# Patient Record
Sex: MALE | Race: WHITE | NOT HISPANIC OR LATINO | Employment: OTHER | URBAN - METROPOLITAN AREA
[De-identification: names, ages, dates, MRNs, and addresses within clinical notes are randomized per-mention and may not be internally consistent; named-entity substitution may affect disease eponyms.]

---

## 2018-07-31 ENCOUNTER — HOSPITAL ENCOUNTER (OUTPATIENT)
Facility: HOSPITAL | Age: 83
Discharge: HOME | End: 2018-08-01
Attending: HOSPITALIST | Admitting: HOSPITALIST
Payer: MEDICARE

## 2018-07-31 DIAGNOSIS — I63.9 STROKE: ICD-10-CM

## 2018-07-31 DIAGNOSIS — I63.311 THROMBOTIC STROKE INVOLVING RIGHT MIDDLE CEREBRAL ARTERY: ICD-10-CM

## 2018-07-31 LAB
BILIRUB UR QL STRIP: NEGATIVE
CLARITY UR: CLEAR
COLOR UR: YELLOW
CREAT SERPL-MCNC: 0.9 MG/DL
EST. GFR  (AFRICAN AMERICAN): >60 ML/MIN/1.73 M^2
EST. GFR  (NON AFRICAN AMERICAN): >60 ML/MIN/1.73 M^2
GLUCOSE UR QL STRIP: NEGATIVE
HGB UR QL STRIP: NEGATIVE
KETONES UR QL STRIP: NEGATIVE
LEUKOCYTE ESTERASE UR QL STRIP: NEGATIVE
NITRITE UR QL STRIP: NEGATIVE
PH UR STRIP: 7 [PH] (ref 5–8)
PROT UR QL STRIP: NEGATIVE
SP GR UR STRIP: 1.01 (ref 1–1.03)
URN SPEC COLLECT METH UR: NORMAL
UROBILINOGEN UR STRIP-ACNC: NEGATIVE EU/DL

## 2018-07-31 PROCEDURE — 94761 N-INVAS EAR/PLS OXIMETRY MLT: CPT

## 2018-07-31 PROCEDURE — 36415 COLL VENOUS BLD VENIPUNCTURE: CPT

## 2018-07-31 PROCEDURE — 81003 URINALYSIS AUTO W/O SCOPE: CPT

## 2018-07-31 PROCEDURE — 82565 ASSAY OF CREATININE: CPT

## 2018-07-31 PROCEDURE — G0378 HOSPITAL OBSERVATION PER HR: HCPCS

## 2018-07-31 PROCEDURE — 25000003 PHARM REV CODE 250: Performed by: HOSPITALIST

## 2018-07-31 PROCEDURE — A4216 STERILE WATER/SALINE, 10 ML: HCPCS | Performed by: HOSPITALIST

## 2018-07-31 PROCEDURE — G0379 DIRECT REFER HOSPITAL OBSERV: HCPCS

## 2018-07-31 PROCEDURE — 63600175 PHARM REV CODE 636 W HCPCS: Performed by: HOSPITALIST

## 2018-07-31 RX ORDER — OXYBUTYNIN CHLORIDE 5 MG/1
5 TABLET, EXTENDED RELEASE ORAL DAILY
Status: DISCONTINUED | OUTPATIENT
Start: 2018-08-01 | End: 2018-08-01 | Stop reason: HOSPADM

## 2018-07-31 RX ORDER — ENOXAPARIN SODIUM 100 MG/ML
40 INJECTION SUBCUTANEOUS EVERY 24 HOURS
Status: DISCONTINUED | OUTPATIENT
Start: 2018-07-31 | End: 2018-08-01 | Stop reason: HOSPADM

## 2018-07-31 RX ORDER — FESOTERODINE FUMARATE 4 MG/1
4 TABLET, EXTENDED RELEASE ORAL DAILY
COMMUNITY
End: 2023-01-25 | Stop reason: ALTCHOICE

## 2018-07-31 RX ORDER — ONDANSETRON 8 MG/1
8 TABLET, ORALLY DISINTEGRATING ORAL EVERY 8 HOURS PRN
Status: DISCONTINUED | OUTPATIENT
Start: 2018-07-31 | End: 2018-08-01 | Stop reason: HOSPADM

## 2018-07-31 RX ORDER — ASPIRIN 81 MG/1
81 TABLET ORAL DAILY
Status: DISCONTINUED | OUTPATIENT
Start: 2018-08-01 | End: 2018-08-01 | Stop reason: HOSPADM

## 2018-07-31 RX ORDER — LOVASTATIN 20 MG/1
20 TABLET ORAL NIGHTLY
COMMUNITY
End: 2022-10-19 | Stop reason: CLARIF

## 2018-07-31 RX ORDER — SODIUM CHLORIDE 0.9 % (FLUSH) 0.9 %
3 SYRINGE (ML) INJECTION EVERY 8 HOURS
Status: DISCONTINUED | OUTPATIENT
Start: 2018-07-31 | End: 2018-08-01 | Stop reason: HOSPADM

## 2018-07-31 RX ORDER — ATORVASTATIN CALCIUM 40 MG/1
40 TABLET, FILM COATED ORAL DAILY
Status: DISCONTINUED | OUTPATIENT
Start: 2018-08-01 | End: 2018-08-01 | Stop reason: HOSPADM

## 2018-07-31 RX ORDER — NAPROXEN SODIUM 220 MG/1
81 TABLET, FILM COATED ORAL DAILY
COMMUNITY

## 2018-07-31 RX ORDER — LABETALOL HYDROCHLORIDE 5 MG/ML
10 INJECTION, SOLUTION INTRAVENOUS
Status: DISCONTINUED | OUTPATIENT
Start: 2018-07-31 | End: 2018-08-01 | Stop reason: HOSPADM

## 2018-07-31 RX ADMIN — Medication 3 ML: at 09:07

## 2018-07-31 RX ADMIN — ENOXAPARIN SODIUM 40 MG: 100 INJECTION SUBCUTANEOUS at 07:07

## 2018-07-31 NOTE — PLAN OF CARE
Outside Transfer Acceptance Note     Patients name: FRANCIS COLLETTA, MRN: [id number]     Transferring Physician or Mid-Level provider/Clinic giving report:   DR. LEONARDO Brar AT [facility] ED     Accepting Physician for admission to hospital: Griffin Nascimento M.D.      Date of acceptance:  7/31/18, observation, level II, telemetry    Allergies: Review of patient's allergies indicates:  Allergies not on file     Reason for transfer:  Needs Neurology      Report from Physician/Mid-Level Provider:    PAST MEDICAL HISTORY:   -Cigarette smoker    HPI: Mr. Colletta is an 84 year old man who presents to the [facility] ED today after developing acute onset right facial drooping and dysarthria around 10:30am, with near resolution around 20 minutes later.  He does take an ASA 81mg po qday.      A tele-stroke was initiated with Dr. Holcomb (see recommendations below).  He was loaded with Plavix 300mg and given ASA 81mg po x 1 in the ED.    Physical Exam  Facial Palsy: 1-->Minor paralysis (flattened nasolabial fold, asymmetry on smiling)  Dysarthria: 1-->Mild-to-moderate dysarthria: patient slurs at least some words and, at worst, can be understood with some difficulty  Total (NIH Stroke Scale): 2     VS:   /114, HR 62, RR 16, o2 100% RA     Labs: BMP - NML, BUN 19, CR 1.1, INR 1, CRP 2, CBC NML    Radiographs:   CT HEAD NC: No hemorrhage. No mass effect. No early infarct signs. 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      EKG: NSR 61      To Do List upon arrival:     Recommendations from Neuro Tele-Stroke, Dr. Holcomb:  Thrombotic stroke involving right middle cerebral artery     Mild left facial droop and dysarthria, some pronation in his left UE but no drift, and no considerable subjective deficit per patient. Symptoms are < 1 hour old and have already improved substantially. Discussed risk and benefit of IVtpa with patient and the decsion at this point  Is to not use thrombolytics at this point. Patient, wife  and ED physician in agreement at this time.     Antithrombotics for secondary stroke prevention:  Load clopidogrel 300 mg x 1 now, followed by daily aspirin 81 mg /  clopidogrel 75 mg x 30 days followed by clopidogrel monotherapy therafter      Statins for secondary stroke prevention and hyperlipidemia, if present:   Statins: Atorvastatin- 80 mg daily     Aggressive risk factor modification: Smoking, HLD     Rehab efforts: PT/OT/SLP to evaluate and treat     Diagnostics ordered/pending: CTA Head to assess vasculature , CTA Neck/Arch to assess vasculature, HgbA1C to assess blood glucose levels, Lipid Profile to assess cholesterol levels, MRI head without contrast to assess brain parenchyma, TTE to assess cardiac function/status      VTE prophylaxis: Heparin 5000 units SQ every 8 hours     BP parameters: Infarct: No intervention, SBP <220      Alteplase Eligible?: No  Alteplase Recommendation: Alteplase not recommended due to Symptoms resolved and minimal deficit   Possible Interventional Revascularization Candidate? No; No significant neurological deficit     Disposition Recommendation: transfer to nearest appropriate facility

## 2018-08-01 VITALS
OXYGEN SATURATION: 98 % | HEIGHT: 69 IN | WEIGHT: 154.31 LBS | TEMPERATURE: 96 F | BODY MASS INDEX: 22.85 KG/M2 | HEART RATE: 56 BPM | DIASTOLIC BLOOD PRESSURE: 86 MMHG | SYSTOLIC BLOOD PRESSURE: 134 MMHG | RESPIRATION RATE: 18 BRPM

## 2018-08-01 PROBLEM — Z51.5 COMFORT MEASURES ONLY STATUS: Status: ACTIVE | Noted: 2018-08-01

## 2018-08-01 LAB
ALBUMIN SERPL BCP-MCNC: 3.6 G/DL
ALP SERPL-CCNC: 75 U/L
ALT SERPL W/O P-5'-P-CCNC: 12 U/L
ANION GAP SERPL CALC-SCNC: 3 MMOL/L
APTT BLDCRRT: 28 SEC
AST SERPL-CCNC: 19 U/L
BASOPHILS # BLD AUTO: 0.03 K/UL
BASOPHILS NFR BLD: 0.4 %
BILIRUB SERPL-MCNC: 0.7 MG/DL
BUN SERPL-MCNC: 18 MG/DL
CALCIUM SERPL-MCNC: 9.6 MG/DL
CHLORIDE SERPL-SCNC: 103 MMOL/L
CHOLEST SERPL-MCNC: 140 MG/DL
CHOLEST/HDLC SERPL: 3.4 {RATIO}
CO2 SERPL-SCNC: 34 MMOL/L
CREAT SERPL-MCNC: 1 MG/DL
DIASTOLIC DYSFUNCTION: YES
DIFFERENTIAL METHOD: NORMAL
EOSINOPHIL # BLD AUTO: 0.2 K/UL
EOSINOPHIL NFR BLD: 3.3 %
ERYTHROCYTE [DISTWIDTH] IN BLOOD BY AUTOMATED COUNT: 14 %
EST. GFR  (AFRICAN AMERICAN): >60 ML/MIN/1.73 M^2
EST. GFR  (NON AFRICAN AMERICAN): >60 ML/MIN/1.73 M^2
ESTIMATED AVG GLUCOSE: 114 MG/DL
ESTIMATED PA SYSTOLIC PRESSURE: 20.98
GLUCOSE SERPL-MCNC: 93 MG/DL
HBA1C MFR BLD HPLC: 5.6 %
HCT VFR BLD AUTO: 46.1 %
HDLC SERPL-MCNC: 41 MG/DL
HDLC SERPL: 29.3 %
HGB BLD-MCNC: 14.9 G/DL
INR PPP: 1
LDLC SERPL CALC-MCNC: 80.6 MG/DL
LYMPHOCYTES # BLD AUTO: 2.1 K/UL
LYMPHOCYTES NFR BLD: 28.1 %
MAGNESIUM SERPL-MCNC: 2.3 MG/DL
MCH RBC QN AUTO: 29 PG
MCHC RBC AUTO-ENTMCNC: 32.3 G/DL
MCV RBC AUTO: 90 FL
MITRAL VALVE MOBILITY: NORMAL
MONOCYTES # BLD AUTO: 0.8 K/UL
MONOCYTES NFR BLD: 10.5 %
NEUTROPHILS # BLD AUTO: 4.2 K/UL
NEUTROPHILS NFR BLD: 57.6 %
NONHDLC SERPL-MCNC: 99 MG/DL
PHOSPHATE SERPL-MCNC: 3.7 MG/DL
PLATELET # BLD AUTO: 168 K/UL
PMV BLD AUTO: 10.6 FL
POTASSIUM SERPL-SCNC: 5.1 MMOL/L
PROT SERPL-MCNC: 6.6 G/DL
PROTHROMBIN TIME: 10.4 SEC
RBC # BLD AUTO: 5.14 M/UL
RETIRED EF AND QEF - SEE NOTES: 60 (ref 55–65)
SODIUM SERPL-SCNC: 140 MMOL/L
TRICUSPID VALVE REGURGITATION: ABNORMAL
TRIGL SERPL-MCNC: 92 MG/DL
TSH SERPL DL<=0.005 MIU/L-ACNC: 1.06 UIU/ML
WBC # BLD AUTO: 7.34 K/UL

## 2018-08-01 PROCEDURE — 84100 ASSAY OF PHOSPHORUS: CPT

## 2018-08-01 PROCEDURE — G8978 MOBILITY CURRENT STATUS: HCPCS | Mod: CI

## 2018-08-01 PROCEDURE — 94761 N-INVAS EAR/PLS OXIMETRY MLT: CPT

## 2018-08-01 PROCEDURE — 92610 EVALUATE SWALLOWING FUNCTION: CPT

## 2018-08-01 PROCEDURE — 84443 ASSAY THYROID STIM HORMONE: CPT

## 2018-08-01 PROCEDURE — G0378 HOSPITAL OBSERVATION PER HR: HCPCS

## 2018-08-01 PROCEDURE — 93306 TTE W/DOPPLER COMPLETE: CPT

## 2018-08-01 PROCEDURE — 85025 COMPLETE CBC W/AUTO DIFF WBC: CPT

## 2018-08-01 PROCEDURE — G8998 SWALLOW D/C STATUS: HCPCS | Mod: CH

## 2018-08-01 PROCEDURE — 93306 TTE W/DOPPLER COMPLETE: CPT | Mod: 26,,, | Performed by: INTERNAL MEDICINE

## 2018-08-01 PROCEDURE — 25500020 PHARM REV CODE 255: Performed by: HOSPITALIST

## 2018-08-01 PROCEDURE — G8988 SELF CARE GOAL STATUS: HCPCS | Mod: CH

## 2018-08-01 PROCEDURE — 25000003 PHARM REV CODE 250: Performed by: HOSPITALIST

## 2018-08-01 PROCEDURE — 83735 ASSAY OF MAGNESIUM: CPT

## 2018-08-01 PROCEDURE — 97165 OT EVAL LOW COMPLEX 30 MIN: CPT

## 2018-08-01 PROCEDURE — 25000003 PHARM REV CODE 250: Performed by: PHYSICIAN ASSISTANT

## 2018-08-01 PROCEDURE — 83036 HEMOGLOBIN GLYCOSYLATED A1C: CPT

## 2018-08-01 PROCEDURE — A4216 STERILE WATER/SALINE, 10 ML: HCPCS | Performed by: HOSPITALIST

## 2018-08-01 PROCEDURE — G8987 SELF CARE CURRENT STATUS: HCPCS | Mod: CH

## 2018-08-01 PROCEDURE — G8979 MOBILITY GOAL STATUS: HCPCS | Mod: CI

## 2018-08-01 PROCEDURE — G0426 INPT/ED TELECONSULT50: HCPCS | Mod: GT,,, | Performed by: PSYCHIATRY & NEUROLOGY

## 2018-08-01 PROCEDURE — 85610 PROTHROMBIN TIME: CPT

## 2018-08-01 PROCEDURE — 97161 PT EVAL LOW COMPLEX 20 MIN: CPT

## 2018-08-01 PROCEDURE — G8997 SWALLOW GOAL STATUS: HCPCS | Mod: CH

## 2018-08-01 PROCEDURE — 36415 COLL VENOUS BLD VENIPUNCTURE: CPT

## 2018-08-01 PROCEDURE — 85730 THROMBOPLASTIN TIME PARTIAL: CPT

## 2018-08-01 PROCEDURE — G8996 SWALLOW CURRENT STATUS: HCPCS | Mod: CH

## 2018-08-01 PROCEDURE — 97802 MEDICAL NUTRITION INDIV IN: CPT

## 2018-08-01 PROCEDURE — G8980 MOBILITY D/C STATUS: HCPCS | Mod: CI

## 2018-08-01 PROCEDURE — G8989 SELF CARE D/C STATUS: HCPCS | Mod: CH

## 2018-08-01 PROCEDURE — 80053 COMPREHEN METABOLIC PANEL: CPT

## 2018-08-01 PROCEDURE — 80061 LIPID PANEL: CPT

## 2018-08-01 PROCEDURE — 99900039 HC SLP GENERIC THERAPY SCREENING (STAT)

## 2018-08-01 RX ORDER — AMLODIPINE BESYLATE 10 MG/1
10 TABLET ORAL DAILY
Qty: 30 TABLET | Refills: 11 | Status: SHIPPED | OUTPATIENT
Start: 2018-08-01 | End: 2023-03-22

## 2018-08-01 RX ADMIN — ASPIRIN 81 MG: 81 TABLET, COATED ORAL at 11:08

## 2018-08-01 RX ADMIN — Medication 3 ML: at 06:08

## 2018-08-01 RX ADMIN — ATORVASTATIN CALCIUM 40 MG: 40 TABLET, FILM COATED ORAL at 11:08

## 2018-08-01 RX ADMIN — OXYBUTYNIN CHLORIDE 5 MG: 5 TABLET, EXTENDED RELEASE ORAL at 11:08

## 2018-08-01 RX ADMIN — IOHEXOL 100 ML: 350 INJECTION, SOLUTION INTRAVENOUS at 08:08

## 2018-08-01 NOTE — PLAN OF CARE
Patient discharge instructions given and reviewed. Med rec reviewed with patient. Patient verbalized understanding. Education provided on new medication, diagnosis, and follow-up appointments. PIV d/isabel.  Tip intact. Pt tolerated well.  Tele removed.  Son at bedside to take pt home.

## 2018-08-01 NOTE — CONSULTS
"  [facility]  Adult Nutrition  Consult Note    SUMMARY     Recommendations    Recommendation/Intervention:   1. Encourage good intake at meals as tolerated.    Goals:   Pt will consume at least 50-75% intake at meals  Nutrition Goal Status: new  Communication of RD Recs: reviewed with RN    Reason for Assessment  Reason for Assessment: consult (assess dietary needs)  Diagnosis: stroke/CVA  Relevant Medical History: HLD, partial hip arthroplasty  General Information Comments: Pt on Regular diet. Tolerating with good intake at meals  Nutrition Discharge Planning: pt to d/c on Regular diet    Nutrition Risk Screen  Nutrition Risk Screen: no indicators present    Nutrition/Diet History  Food Preferences: no Baptist or cultural food prefs identified  Do you have any cultural, spiritual, Baptist conflicts, given your current situation?: none reported    Anthropometrics  Temp: 97.2 °F (36.2 °C)  Height: 5' 9" (175.3 cm)  Height (inches): 69 in  Weight Method: Bed Scale  Weight: 70 kg (154 lb 5.2 oz)  Weight (lb): 154.32 lb  Ideal Body Weight (IBW), Male: 160 lb  % Ideal Body Weight, Male (lb): 96.45 lb  BMI (Calculated): 22.8  BMI Grade: 18.5-24.9 - normal     Lab/Procedures/Meds  Pertinent Labs Reviewed: reviewed  Pertinent Medications Reviewed: reviewed  Pertinent Medications Comments: aspirin    Physical Findings/Assessment  Overall Physical Appearance:  (WNL)  Tubes:  (none)  Oral/Mouth Cavity: WDL  Skin:  (Johann 21-intact)    Estimated/Assessed Needs  Weight Used For Calorie Calculations: 70 kg (154 lb 5.2 oz)  Energy Calorie Requirements (kcal): 2100  Energy Need Method: Kcal/kg (30 kcal/kg)  Protein Requirements: 70g (1.0g/kg)  Weight Used For Protein Calculations: 70 kg (154 lb 5.2 oz)  Fluid Need Method: RDA Method  RDA Method (mL): 2100     Nutrition Prescription Ordered  Current Diet Order: Regular    Evaluation of Received Nutrient/Fluid Intake  Energy Calories Required: meeting " needs  Protein Required: meeting needs  Fluid Required: meeting needs  % Intake of Estimated Energy Needs: 50 - 75 %  % Meal Intake: 50 - 75 %    Nutrition Risk  Level of Risk/Frequency of Follow-up:  (1xweekly)     Assessment and Plan  No new Assessment & Plan notes have been filed under this hospital service since the last note was generated.  Service: Nutrition     Monitor and Evaluation  Food and Nutrient Intake: food and beverage intake  Food and Nutrient Adminstration: diet order  Physical Activity and Function: nutrition-related ADLs and IADLs  Anthropometric Measurements: weight  Biochemical Data, Medical Tests and Procedures: electrolyte and renal panel  Nutrition-Focused Physical Findings: overall appearance     Nutrition Follow-Up  RD Follow-up?: Yes

## 2018-08-01 NOTE — PLAN OF CARE
Follow-up With  Details  Why  Contact Info   late AM early PM preferred         Sinan Miller MD  Go on 8/7/2018  @ 3:30pm, FAX# [phone]  [address] [facility] [address]  [phone]   Nav Boyle MD  Go on 10/2/2018  @ 10:20am (this is Neurology follow up)  [address]  [facility] [address]  [phone]        [place] sent ambulatory referral and packet of notes to Neurologist Dr. Meza in [place]    Fax   Phone: [phone]  SEt for [facility] in case [facility] does not accept.     08/01/18 1631   Final Note   Assessment Type Final Discharge Note   Discharge Disposition Home   What phone number can be called within the next 1-3 days to see how you are doing after discharge? [phone]   Hospital Follow Up  Appt(s) scheduled? Yes   Discharge plans and expectations educations in teach back method with documentation complete? Yes   Right Care Referral Info   Post Acute Recommendation No Care

## 2018-08-01 NOTE — SUBJECTIVE & OBJECTIVE
Past Medical History:   Diagnosis Date    Frequent urination     Hyperlipidemia        Past Surgical History:   Procedure Laterality Date    JOINT REPLACEMENT         Review of patient's allergies indicates:  No Known Allergies    No current facility-administered medications on file prior to encounter.      No current outpatient prescriptions on file prior to encounter.     Family History     None        Social History Main Topics    Smoking status: Current Every Day Smoker     Packs/day: 0.25     Types: Cigarettes    Smokeless tobacco: Never Used    Alcohol use No    Drug use: No    Sexual activity: Not on file     Review of Systems   Constitutional: Negative for fever.   Respiratory: Negative for shortness of breath.    Cardiovascular: Negative for chest pain.   Gastrointestinal: Negative for abdominal pain, constipation, nausea and vomiting.   Musculoskeletal: Negative for gait problem.   Skin: Negative for color change.   Allergic/Immunologic: Negative for immunocompromised state.   Neurological: Positive for speech difficulty (slurred speech, resolved ). Negative for dizziness, weakness, numbness and headaches.   Psychiatric/Behavioral: Negative for agitation.   All other systems reviewed and are negative.    Objective:     Vital Signs (Most Recent):  Temp: 96.7 °F (35.9 °C) (07/31/18 1745)  Pulse: (!) 55 (07/31/18 1745)  Resp: 14 (07/31/18 1745)  BP: (!) 194/107 (07/31/18 1745) Vital Signs (24h Range):  Temp:  [96.7 °F (35.9 °C)-97.8 °F (36.6 °C)] 96.7 °F (35.9 °C)  Pulse:  [55-57] 55  Resp:  [14-16] 14  SpO2:  [98 %] 98 %  BP: (184-194)/(107-108) 194/107     Weight: 70 kg (154 lb 5.2 oz)  Body mass index is 22.79 kg/m².    Physical Exam   Constitutional: He is oriented to person, place, and time. He appears well-developed and well-nourished. No distress.   HENT:   Head: Normocephalic and atraumatic.   Mouth/Throat: Oropharynx is clear and moist.   Eyes: Conjunctivae and EOM are normal.   Neck: Normal  range of motion. Neck supple.   Cardiovascular: Normal rate, regular rhythm and normal heart sounds.    Pulmonary/Chest: Effort normal and breath sounds normal. No respiratory distress. He has no wheezes. He has no rales. He exhibits no tenderness.   Abdominal: Soft. Bowel sounds are normal. He exhibits no distension and no mass. There is no tenderness. There is no rebound and no guarding.   Musculoskeletal: Normal range of motion.   Neurological: He is alert and oriented to person, place, and time. No cranial nerve deficit. Coordination normal.   Skin: Skin is warm and dry. Capillary refill takes less than 2 seconds.   Psychiatric: He has a normal mood and affect.   Nursing note and vitals reviewed.        CRANIAL NERVES     CN III, IV, VI   Extraocular motions are normal.        Significant Labs: All pertinent labs within the past 24 hours have been reviewed.    Significant Imaging: I have reviewed and interpreted all pertinent imaging results/findings within the past 24 hours.

## 2018-08-01 NOTE — ASSESSMENT & PLAN NOTE
Stroke    Continue aspirin and lovastatin.  F/U with neuro in 6 weeks.  Amlodipine added for HTN.

## 2018-08-01 NOTE — PLAN OF CARE
Problem: Occupational Therapy Goal  Goal: Occupational Therapy Goal  Outcome: Outcome(s) achieved Date Met: 08/01/18  OT eval complete; note to follow. Pt performing at PLOF and no DME needs are noted this date. D/C IP OT

## 2018-08-01 NOTE — PLAN OF CARE
Problem: Physical Therapy Goal  Goal: Physical Therapy Goal  Outcome: Outcome(s) achieved Date Met: 08/01/18  PT evaluation completed, note to follow. Pt is functioning at his PLOF with no deficits noted. D/C PT with no needs upon d/c.

## 2018-08-01 NOTE — PLAN OF CARE
Problem: Patient Care Overview  Goal: Plan of Care Review  Outcome: Ongoing (interventions implemented as appropriate)  Recommendation/Intervention:   1. Encourage good intake at meals as tolerated.    Goals:   Pt will consume at least 50-75% intake at meals  Nutrition Goal Status: new  Communication of RD Recs: reviewed with RN

## 2018-08-01 NOTE — DISCHARGE SUMMARY
[facility] Hospital Medicine  Discharge Summary      Patient Name: Francis J Colletta  MRN: [id number]  Admission Date: 7/31/2018  Hospital Length of Stay: 0 days  Discharge Date and Time:  08/01/2018 5:19 PM  Attending Physician: Garret Shoemaker MD   Discharging Provider: Mery Davenport PA-C  Primary Care Provider: Sinan Miller MD      HPI:   CC: slurred speech at 11:45am, resolved     HPI: Francis J Colletta, a 84 y.o. male with PMH significant for HLD that presents for evaluation of an episode of slurred speech that started around 1145 am.  Patient states that it lasted for about 15 minutes and then resolved back to his baseline.  He was evaluated for this issue at a hospital in [place] were basic lab work including UA, CBC, CMP, PT, troponin and EKG were performed that showed no acute abnormalities.  He was transferred to this facility for a neurology consult.  Patient denies any previous history of stroke or MI.  Patient has no complaints at this time and states that he is feeling well.  Denies any numbness, weakness, CP or SOB.               * No surgery found *      Hospital Course:   MRI shows acute infarction along the body of the right caudate nucleus without associated hemorrhage or abnormal mass effects.  Neurovascular consult initiated and recommendations are to continue aspirin and Lovastatin then to f/u in six weeks.  Return with any new or worsening symptoms.       Consults:   Consults         Status Ordering Provider     Inpatient consult to Registered Dietitian/Nutritionist  Once     Provider:  (Not yet assigned)    Completed GARRET SHOEMAKER     Inpatient consult to Vascular (Stroke) Neurology  Once     Provider:  (Not yet assigned)    Acknowledged GARRET SHOEMAKER     IP consult to case management/social work  Once     Provider:  (Not yet assigned)    Acknowledged GARRET SHOEMAKER          * Thrombotic stroke involving right middle cerebral artery     Stroke    Continue aspirin and lovastatin.  F/U with neuro in 6 weeks.  F/U with PCP for evaluation of elevated BP.            Final Active Diagnoses:    Diagnosis Date Noted POA    PRINCIPAL PROBLEM:  Thrombotic stroke involving right middle cerebral artery [I63.311] 07/31/2018 Yes    Comfort measures only status [Z51.5] 08/01/2018 Not Applicable    Stroke [I63.9] 07/31/2018 Yes      Problems Resolved During this Admission:    Diagnosis Date Noted Date Resolved POA       Discharged Condition: good    Disposition: Home or Self Care    Follow Up:  Follow-up Information     late AM early PM preferred.           Sinan Miller MD. Go on 8/7/2018.    Specialty:  Internal Medicine  Why:  @ 3:30pm, FAX#  [phone]  Contact information:  [address] [place] [address]  [phone]             Nav Boyle MD. Go on 10/2/2018.    Specialty:  Neurology  Why:  @ 10:20am (this is Neurology follow up)  Contact information:  [address] [address]  [phone]                 Patient Instructions:     Ambulatory Referral to Vascular Neurology   Referral Priority: Routine Referral Type: Consultation   Referral Reason: Specialty Services Required    Requested Specialty: Vascular Neurology    Number of Visits Requested: 1      Diet Adult Regular     Activity as tolerated         Significant Diagnostic Studies: Labs:   CMP   Recent Labs  Lab 07/31/18  1918 08/01/18  0457   NA  --  140   K  --  5.1   CL  --  103   CO2  --  34*   GLU  --  93   BUN  --  18   CREATININE 0.9 1.0   CALCIUM  --  9.6   PROT  --  6.6   ALBUMIN  --  3.6   BILITOT  --  0.7   ALKPHOS  --  75   AST  --  19   ALT  --  12   ANIONGAP  --  3*   ESTGFRAFRICA >60 >60   EGFRNONAA >60 >60   , CBC   Recent Labs  Lab 08/01/18  0457   WBC 7.34   HGB 14.9   HCT 46.1      , Lipid Panel   Lab Results   Component Value Date    CHOL 140 08/01/2018    HDL 41 08/01/2018    LDLCALC 80.6 08/01/2018    TRIG 92 08/01/2018    CHOLHDL 29.3  08/01/2018    and Troponin No results for input(s): TROPONINI in the last 168 hours.  Radiology: MRI: Acute infarction along the body of the right caudate nucleus without associated hemorrhage or abnormal mass effects.     Pending Diagnostic Studies:     None         Medications:  Reconciled Home Medications:      Medication List      CONTINUE taking these medications    aspirin 81 MG Chew  Take 81 mg by mouth once daily.     lovastatin 20 MG tablet  Commonly known as:  MEVACOR  Take 20 mg by mouth every evening.     TOVIAZ 4 mg Tb24  Generic drug:  fesoterodine  Take 4 mg by mouth once daily.            Indwelling Lines/Drains at time of discharge:   Lines/Drains/Airways          No matching active lines, drains, or airways          Time spent on the discharge of patient: 35 minutes  Patient was seen and examined on the date of discharge and determined to be suitable for discharge.         Mery Davenport PA-C  Department of Hospital Medicine  [facility]

## 2018-08-01 NOTE — PLAN OF CARE
Problem: Patient Care Overview  Goal: Plan of Care Review  Outcome: Revised  Pt stable. NO distress noted. POC reviewed with pt. Pt verbalized understanding. Pt remains SB on the monitor. NO true red alarms noted. Pt denied pain. Neuro intact with NIH score of 1. NO drifts noted. Pt afebrile. Fall precautions maintained. Bed in lowest position, call light in reach and bed alarm on.

## 2018-08-01 NOTE — PT/OT/SLP EVAL
Physical Therapy Evaluation and Discharge Note    Patient Name:  Francis J Colletta   MRN:  [id number]    Recommendations:     Discharge Recommendations:  home   Discharge Equipment Recommendations: none   Barriers to discharge: None    Assessment:     Francis J Colletta is a 84 y.o. male admitted with a medical diagnosis of Thrombotic stroke involving right middle cerebral artery. At this time, patient is functioning at their prior level of function and does not require further acute PT services. Pt is functioning at his PLOF with no deficits noted. D/C PT with no needs upon d/c.    Recent Surgery: * No surgery found *      Plan:     During this hospitalization, patient does not require further acute PT services.  Please re-consult if situation changes.     Plan of Care Reviewed with: patient, spouse    Subjective     Communicated with DON Harmon prior to session.  Patient found supine with HOB elevated upon PT entry to room, agreeable to evaluation.      Chief Complaint: none  Patient comments/goals: pt reporting he feels back to his baselin  Pain/Comfort:  · Pain Rating 1: 0/10 (reports slight pain in R hip with gait that is his baseline)    Patients cultural, spiritual, Yazidi conflicts given the current situation: none reported    Living Environment:  Pt lives with his wife in a [facility] with THE and tub/shower combo.  Prior to admission, patients level of function was independent without AD for all mobility and ADLs without AD and drives.  Patient has the following equipment: none.  DME owned (not currently used): rolling walker and single point cane.  Upon discharge, patient will have assistance from his wife is home to provide supervision.    Objective:     Patient found with: telemetry, bed alarm     General Precautions: Standard, fall   Orthopedic Precautions:N/A   Braces: N/A     Exams:  · Cognitive Exam:  Patient is oriented x 4 and follows 100% of multi-step commands   · Postural Exam:  Patient  presented with the following abnormalities:    · -       Rounded shoulders  · -       Forward head  · Sensation:    · -       Intact  · Skin Integrity/Edema:      · -       Skin integrity: Visible skin intact  · -       Edema: None noted    · RLE ROM: WFL except lacking ~20 deg from terminal knee extension  · RLE Strength: WFL  · LLE ROM: WFL except lacking ~20 deg from terminal knee ext  · LLE Strength: WFL    Functional Mobility:  · Bed Mobility:     · Scooting: modified independence  · Supine to Sit: modified independence  · Sit to Supine: modified independence  · Transfers:     · Sit to Stand:  modified independence with no AD  · Gait: 200 ft with no AD mod I over level surface    AM-PAC 6 CLICK MOBILITY  Total Score:23       Therapeutic Activities and Exercises:  Pt with no noted deficits and functioning at mod I level.     Patient left HOB elevated with all lines intact, call button in reach, bed alarm on, RN notified and pt's wife present.    GOALS:    Physical Therapy Goals     Not on file          Multidisciplinary Problems (Resolved)        Problem: Physical Therapy Goal    Goal Priority Disciplines Outcome Goal Variances Interventions   Physical Therapy Goal   (Resolved)     PT/OT, PT Outcome(s) achieved                     History:     Past Medical History:   Diagnosis Date    Frequent urination     Hyperlipidemia     Hyperlipidemia        Past Surgical History:   Procedure Laterality Date    EYE SURGERY      JOINT REPLACEMENT      PARTIAL HIP ARTHROPLASTY Left        Clinical Decision Making:     History  Co-morbidities and personal factors that may impact the plan of care Examination  Body Structures and Functions, activity limitations and participation restrictions that may impact the plan of care Clinical Presentation   Decision Making/ Complexity Score   Co-morbidities:   [] Time since onset of injury / illness / exacerbation  [] Status of current condition  []Patient's cognitive status and  safety concerns    [] Multiple Medical Problems (see med hx)  Personal Factors:   [x] Patient's age  [] Prior Level of function   [] Patient's home situation (environment and family support)  [] Patient's level of motivation  [] Expected progression of patient      HISTORY:(criteria)    [] 67739 - no personal factors/history    [x] 81249 - has 1-2 personal factor/comorbidity     [] 13241 - has >3 personal factor/comorbidity     Body Regions:  [] Objective examination findings  [] Head     []  Neck  [] Trunk   [] Upper Extremity  [] Lower Extremity    Body Systems:  [] For communication ability, affect, cognition, language, and learning style: the assessment of the ability to make needs known, consciousness, orientation (person, place, and time), expected emotional /behavioral responses, and learning preferences (eg, learning barriers, education  needs)  [] For the neuromuscular system: a general assessment of gross coordinated movement (eg, balance, gait, locomotion, transfers, and transitions) and motor function  (motor control and motor learning)  [] For the musculoskeletal system: the assessment of gross symmetry, gross range of motion, gross strength, height, and weight  [] For the integumentary system: the assessment of pliability(texture), presence of scar formation, skin color, and skin integrity  [] For cardiovascular/pulmonary system: the assessment of heart rate, respiratory rate, blood pressure, and edema     Activity limitations:    [] Patient's cognitive status and saf ety concerns          [] Status of current condition      [] Weight bearing restriction  [] Cardiopulmunary Restriction    Participation Restrictions:   [] Goals and goal agreement with the patient     [] Rehab potential (prognosis) and probable outcome      Examination of Body System: (criteria)    [x] 53231 - addressing 1-2 elements    [] 80412 - addressing a total of 3 or more elements     [] 66870 -  Addressing a total of 4 or more  elements         Clinical Presentation: (criteria)  Stable - 28271     On examination of body system using standardized tests and measures patient presents with 1-2 elements from any of the following: body structures and functions, activity limitations, and/or participation restrictions.  Leading to a clinical presentation that is considered stable and/or uncomplicated                              Clinical Decision Making  (Eval Complexity):  Low- 81590     Time Tracking:     PT Received On: 08/01/18  PT Start Time: 1140     PT Stop Time: 1159  PT Total Time (min): 19 min     Billable Minutes: Evaluation 19      Janna Cheng, PT  08/01/2018

## 2018-08-01 NOTE — ASSESSMENT & PLAN NOTE
Stroke    Stroke w/u initiated.  MRI, CT/CTA ordered.  PT/OT/SLP.  2D ECHO, lipid panel, A1C,TSH, Neuro Checks Q 4.

## 2018-08-01 NOTE — PT/OT/SLP EVAL
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 male with PMH significant for HLD that presents for evaluation of an episode of slurred speech that started around 1145 am.  Patient states that it lasted for about 15 minutes and then resolved back to his baseline.  He was evaluated for this issue at a hospital in [place] were basic lab work including UA, CBC, CMP, PT, troponin and EKG were performed that showed no acute abnormalities.  He was transferred to this facility for a neurology consult.  Patient denies any previous history of stroke or MI.  Patient has no complaints at this time and states that he is feeling well.  Denies any numbness, weakness, CP or SOB.      Social History: Patient lives with spouse at home in [place], [place].    Prior Intubation HX:  N/A    Modified Barium Swallow: None on file    Chest X-Rays: None on file    Prior diet: Per pt, regular/thin liquids PO diet.    Subjective     Pt participate in clinical swallow eval and informal speech screening this PM. SLP confirmed with RN prior to entry. Pt oriented x4 and agreed to participate in skilled ST session.    Patient goals: "I was supposed to be getting dental work done yesterday but this all happened" per pt     Pain/Comfort:  · Pain Rating " 1: 010    Objective:     Oral Musculature Evaluation  · Oral Musculature: WFL  · Dentition: present and adequate  · Mucosal Quality: good  · Mandibular Strength and Mobility: WFL  · Oral Labial Strength and Mobility: WFL  · Lingual Strength and Mobility: WFL  · Buccal Strength and Mobility: WFL  · Volitional Swallow:  (timely upon palpation)  · Voice Prior to PO Intake:  (clear)    Bedside Swallow Eval:   Consistencies Assessed:  · Thin liquids -via cup sips x4, straw x5  · Puree -(apple sauce) x4  · Solids -(giovany cracker) x3     Oral Phase:   · WFL    Pharyngeal Phase:   · no overt clinical signs/symptoms of aspiration  · no overt clinical signs/symptoms of pharyngeal dysphagia    Compensatory Strategies  · None    Speech-Language Screening:  -Pt oriented to name, , situation and time  -Pt demonstrated good STM skills, as pt able to recall 3/3 unrelated words, with min cuing (pt required catagory x1) given by SLP with 2 min delay during delayed memory recall task  -Pt demonstrated receptive and expressive language skills to be judged WFL-- pt able to follow 2-3 step commands, identify items within room, and demonstrated appropriate sentence formulation during conversations.  -Pt also able to name 10/10 items within in room and given body parts  -Pt able to recall PmHx and state reasoning for current admission here at [facility]  -Pt also participated in diadochokinetics task (/pa/, /ta/, /ka/) and demonstrated motor speech skills to be judged WFL   -Overall, pt demonstrates baseline speech-language and cognitive skills.    Treatment: Pt participated in clinical swallow eval this PM. Pt tolerated thin liquids, puree, and hard solid textures with no overt s/s of aspiration, at bedside. Pt also demonstrates baseline cognitive-linguistic skills.  SLP educated pt and pt's spouse on role of SLP, clinical swallow eval, diet recs/swallow precautions, informal speech screen/results and POC. Pt and pt's spouse  acknowledged and confirmed understanding.        Assessment:     Francis J Colletta is a 84 y.o. male admitted with Thrombotic stroke involving right middle cerebral artery.  He presents with oral and pharyngeal phases of the swallow judged to be WFL. Pt also demonstrates baseline cognitive-linguistic skills.   SLP recs: Regular/thin liquids po diet with universal swallow precautions. ST services no longer required as pt has met all ST goals. SLP notified Komal ESQUEDA of results/recs. Please re-consult should pt experience any change in status.    Goals:    SLP Goals     Not on file          Multidisciplinary Problems (Resolved)        Problem: SLP Goal    Goal Priority Disciplines Outcome   SLP Goal   (Resolved)     SLP Outcome(s) achieved   Description:  Short Term Goals:  1. Pt will participate in BSS to determine least restrictive diet.- MET 8/1  2. Pt will participate in informal speech-lang eval to r/o cognitive-linguistic deficits.- MET 8/1                      Plan:     · Plan of Care reviewed with:  patient, spouse (Komal ESQUEDA)   · SLP Follow-Up:  No       Discharge recommendations:  home   Barriers to Discharge:  None    Time Tracking:     SLP Treatment Date:   08/01/18  Speech Start Time:  1350  Speech Stop Time:  1410     Speech Total Time (min):  20 min    Billable Minutes: Eval Swallow and Oral Function 20    JAYCE Ward., CF-SLP  Speech-Language Pathologist   8/1/2018

## 2018-08-01 NOTE — H&P
[facility] Hospital Medicine  History & Physical    Patient Name: Francis J Colletta  MRN: [id number]  Admission Date: 7/31/2018  Attending Physician: Garret Rosales MD   Primary Care Provider: Primary Doctor No         Patient information was obtained from patient, spouse/SO, relative(s) and ER records.     Subjective:     Principal Problem:Thrombotic stroke involving right middle cerebral artery    Chief Complaint: No chief complaint on file.       HPI: CC: slurred speech at 11:45am, resolved     HPI: Francis J Colletta, a 84 y.o. male with PMH significant for HLD that presents for evaluation of an episode of slurred speech that started around 1145 am.  Patient states that it lasted for about 15 minutes and then resolved back to his baseline.  He was evaluated for this issue at a hospital in [place] were basic lab work including UA, CBC, CMP, PT, troponin and EKG were performed that showed no acute abnormalities.  He was transferred to this facility for a neurology consult.  Patient denies any previous history of stroke or MI.  Patient has no complaints at this time and states that he is feeling well.  Denies any numbness, weakness, CP or SOB.               Past Medical History:   Diagnosis Date    Frequent urination     Hyperlipidemia        Past Surgical History:   Procedure Laterality Date    JOINT REPLACEMENT         Review of patient's allergies indicates:  No Known Allergies    No current facility-administered medications on file prior to encounter.      No current outpatient prescriptions on file prior to encounter.     Family History     None        Social History Main Topics    Smoking status: Current Every Day Smoker     Packs/day: 0.25     Types: Cigarettes    Smokeless tobacco: Never Used    Alcohol use No    Drug use: No    Sexual activity: Not on file     Review of Systems   Constitutional: Negative for fever.   Respiratory: Negative for shortness of breath.     Cardiovascular: Negative for chest pain.   Gastrointestinal: Negative for abdominal pain, constipation, nausea and vomiting.   Musculoskeletal: Negative for gait problem.   Skin: Negative for color change.   Allergic/Immunologic: Negative for immunocompromised state.   Neurological: Positive for speech difficulty (slurred speech, resolved ). Negative for dizziness, weakness, numbness and headaches.   Psychiatric/Behavioral: Negative for agitation.   All other systems reviewed and are negative.    Objective:     Vital Signs (Most Recent):  Temp: 96.7 °F (35.9 °C) (07/31/18 1745)  Pulse: (!) 55 (07/31/18 1745)  Resp: 14 (07/31/18 1745)  BP: (!) 194/107 (07/31/18 1745) Vital Signs (24h Range):  Temp:  [96.7 °F (35.9 °C)-97.8 °F (36.6 °C)] 96.7 °F (35.9 °C)  Pulse:  [55-57] 55  Resp:  [14-16] 14  SpO2:  [98 %] 98 %  BP: (184-194)/(107-108) 194/107     Weight: 70 kg (154 lb 5.2 oz)  Body mass index is 22.79 kg/m².    Physical Exam   Constitutional: He is oriented to person, place, and time. He appears well-developed and well-nourished. No distress.   HENT:   Head: Normocephalic and atraumatic.   Mouth/Throat: Oropharynx is clear and moist.   Eyes: Conjunctivae and EOM are normal.   Neck: Normal range of motion. Neck supple.   Cardiovascular: Normal rate, regular rhythm and normal heart sounds.    Pulmonary/Chest: Effort normal and breath sounds normal. No respiratory distress. He has no wheezes. He has no rales. He exhibits no tenderness.   Abdominal: Soft. Bowel sounds are normal. He exhibits no distension and no mass. There is no tenderness. There is no rebound and no guarding.   Musculoskeletal: Normal range of motion.   Neurological: He is alert and oriented to person, place, and time. No cranial nerve deficit. Coordination normal.   Skin: Skin is warm and dry. Capillary refill takes less than 2 seconds.   Psychiatric: He has a normal mood and affect.   Nursing note and vitals reviewed.        CRANIAL NERVES     CN  III, IV, VI   Extraocular motions are normal.        Significant Labs: All pertinent labs within the past 24 hours have been reviewed.    Significant Imaging: I have reviewed and interpreted all pertinent imaging results/findings within the past 24 hours.    Assessment/Plan:     * Thrombotic stroke involving right middle cerebral artery      Stroke    Stroke w/u initiated.  MRI, CT/CTA ordered.  PT/OT/SLP.  2D ECHO, lipid panel, A1C,TSH, Neuro Checks Q 4.            VTE Risk Mitigation         Ordered     enoxaparin injection 40 mg  Daily      07/31/18 1900     IP VTE LOW RISK PATIENT  Once      07/31/18 1900             Mery Davenport PA-C  Department of Hospital Medicine   [facility]

## 2018-08-01 NOTE — HPI
CC: slurred speech at 11:45am, resolved     HPI: Francis J Colletta, a 84 y.o. male with PMH significant for HLD that presents for evaluation of an episode of slurred speech that started around 1145 am.  Patient states that it lasted for about 15 minutes and then resolved back to his baseline.  He was evaluated for this issue at a hospital in [place] were basic lab work including UA, CBC, CMP, PT, troponin and EKG were performed that showed no acute abnormalities.  He was transferred to this facility for a neurology consult.  Patient denies any previous history of stroke or MI.  Patient has no complaints at this time and states that he is feeling well.  Denies any numbness, weakness, CP or SOB.

## 2018-08-01 NOTE — PLAN OF CARE
Problem: SLP Goal  Goal: SLP Goal  Short Term Goals:  1. Pt will participate in BSS to determine least restrictive diet.- MET 8/1  2. Pt will participate in informal speech-lang eval to r/o cognitive-linguistic deficits.- MET 8/1    Outcome: Outcome(s) achieved Date Met: 08/01/18 8/1:  Pt participated in clinical swallow eval this PM. Pt tolerated thin liquids, puree, and hard solid textures with no overt s/s of aspiration, at bedside. Pt also demonstrates baseline cognitive-linguistic skills. SLP recs: Regular/thin liquids po diet with universal swallow precautions. ST services no longer required as pt has met all ST goals. SLP notified Komal ESQUEDA of results/recs. Please re-consult should pt experience any change in status.  JAYCE Ward., CF-SLP  Speech-Language Pathologist

## 2018-08-01 NOTE — PT/OT/SLP PROGRESS
Physical Therapy  Eval Attempts    Patient Name:  Francis J Colletta   MRN:  [id number]    Patient not seen today secondary to pt LÓPEZ for testing during 0915 and 1105 attempts. Will follow-up as available.    Janna Cheng, PT   8/1/2018

## 2018-08-01 NOTE — HOSPITAL COURSE
MRI shows acute infarction along the body of the right caudate nucleus without associated hemorrhage or abnormal mass effects.  Neurovascular consult initiated and recommendations are to continue aspirin and Lovastatin then to f/u in six weeks.  Return with any new or worsening symptoms.

## 2018-08-01 NOTE — PLAN OF CARE
Patient is out for testing, wife completed initial DC assessment questions.    Patient is independent and drives, wife of same capacity.    Son lives in [place], will provide transportation home once ready.    No hx of neuro or cards.    PCP office closed at this time, will call for f/u.    Patient likely to need ambulatory referral to Neurology as they live [place] away.  Follow-up With  Details  Why  Contact Info   late AM early PM preferred         Sinan Miller MD    office opens at 9am  [address] [place] [id number]  [phone]             08/01/18 0808   Discharge Assessment   Assessment Type Discharge Planning Assessment   Prior to hospitilization cognitive status: Alert/Oriented   Prior to hospitalization functional status: Independent   Facility Arrived From: home   Lives With spouse   Able to Return to Prior Arrangements unable to determine at this time (comments)   Who are your caregiver(s) and their phone number(s)? LeniJimmy armenta Spouse     [phone]    Patient's perception of discharge disposition home or selfcare   Readmission Within The Last 30 Days no previous admission in last 30 days   Patient currently being followed by outpatient case management? No   Patient currently receives any other outside agency services? No   Equipment Currently Used at Home none   Do you have any problems affording any of your prescribed medications? No   Is the patient taking medications as prescribed? yes   Does the patient have transportation home? Yes   Transportation Available family or friend will provide   Dialysis Name and Scheduled days n/a   Does the patient receive services at the [facility]? No   Discharge Plan A Home with family;Home Health   Patient/Family In Agreement With Plan yes

## 2018-08-01 NOTE — PT/OT/SLP EVAL
Occupational Therapy   Evaluation and Discharge Note    Name: Francis J Colletta  MRN: [id number]  Admitting Diagnosis:  Thrombotic stroke involving right middle cerebral artery      Recommendations:     Discharge Recommendations: home  Discharge Equipment Recommendations:  none  Barriers to discharge:  None    History:     Occupational Profile:  Living Environment: Pt lives with spouse in [facility] [place], tub/shower combo.  Previous level of function: I with all ADLs, functional mobility, and driving.   Equipment Owned:  none  Assistance upon Discharge: Spouse/Family    Past Medical History:   Diagnosis Date    Frequent urination     Hyperlipidemia     Hyperlipidemia        Past Surgical History:   Procedure Laterality Date    EYE SURGERY      JOINT REPLACEMENT      PARTIAL HIP ARTHROPLASTY Left        Subjective     Chief Complaint: Pt with no complaints this date.  Patient/Family stated goals: To return home  Communicated with: yusuf prior to session.  Pain/Comfort:  · Pain Rating 1: 0/10    Patients cultural, spiritual, Tenriism conflicts given the current situation:      Objective:     Patient found with: telemetry, bed alarm    General Precautions: Standard, fall   Orthopedic Precautions:N/A   Braces: N/A     Occupational Performance:    Bed Mobility:    · Patient completed Rolling/Turning to Left with  independence  · Patient completed Scooting/Bridging with independence  · Patient completed Supine to Sit with independence  · Patient completed Sit to Supine with independence    Functional Mobility/Transfers:  · Patient completed Sit <> Stand Transfer with independence  with  no assistive device   · Functional Mobility: Pt noted that since R hip replacement 1 year ago, he occasionally has difficulty with decreased dorsiflexion RLE    Activities of Daily Living:  · Upper Body Dressing: independence seated EOB to don gown as robe.  · Lower Body Dressing: independence seated EOB to don/doff  socks/shoes    Cognitive/Visual Perceptual:  Cognitive/Psychosocial Skills:     -       Oriented to: Person, Place, Time and Situation   -       Follows Commands/attention:Follows multistep  commands  -       Communication: clear/fluent  -       Memory: No Deficits noted  -       Safety awareness/insight to disability: intact   -       Mood/Affect/Coping skills/emotional control: Appropriate to situation  Visual/Perceptual:      -Intact    Physical Exam:  Balance: -       WFL; dynamic and static seated balance Good; dynamic standing balance Good  Postural examination/scapula alignment:    -       Rounded shoulders  -       Forward head  Sensation:    -       Intact  Motor Planning: -       WFL; no deficits noted  Upper Extremity Range of Motion:  BUE WFL  Upper Extremity Strength: BUE WFL   Strength: 4+/5  Fine Motor Coordination: Pt with slight difficulty to perform thumb/finger opposition, requiring inc v/c to perform skill.   Gross motor coordination: BUE WF  Neurological: No neurological deficits noted this date. Pt reports no decreased sensation/strength.    Patient left HOB elevated with all lines intact, call button in reach, bed alarm on, nsg notified and spouse present    [facility] 6 Click:  [facility] Total Score: 24    Treatment & Education:  Pt educated on role of OT and POC.  Pt educated on fall prevention measures including possible side effects of new medication, use of non-skid socks while ambulating, and the importance of calling for nsg staff prior to getting out of bed. Pt verbalized understanding.  Pt performing skills as listed above at [facility] and states that he feels he is at baseline, wife agreed.  Education:    Assessment:     Francis J Colletta is a 84 y.o. male with a medical diagnosis of Thrombotic stroke involving right middle cerebral artery. At this time, patient is functioning at their prior level of function and does not require further acute OT services.     Clinical Decision Making:  "    1.  OT Low:  "Pt evaluation falls under low complexity for evaluation coding due to performance deficits noted in 1-3 areas as stated above and 0 co-morbities affecting current functional status. Data obtained from problem focused assessments. No modifications or assistance was required for completion of evaluation. Only brief occupational profile and history review completed."     Plan:     During this hospitalization, patient does not require further acute OT services.  Please re-consult if situation changes.    · Plan of Care Reviewed with: patient, spouse    This Plan of care has been discussed with the patient who was involved in its development and understands and is in agreement with the identified goals and treatment plan    GOALS:    Occupational Therapy Goals     Not on file          Multidisciplinary Problems (Resolved)        Problem: Occupational Therapy Goal    Goal Priority Disciplines Outcome Interventions   Occupational Therapy Goal   (Resolved)     OT, PT/OT Outcome(s) achieved                    Time Tracking:     OT Date of Treatment: 08/01/18  OT Start Time: 1142  OT Stop Time: 1155  OT Total Time (min): 13 min CoTx PT    Billable Minutes:Evaluation 13    Christine Walsh OT  8/1/2018    "

## 2018-08-02 NOTE — HPI
75 y/o male transferred from [facility] to [facility] for evaluation of a 15 minute episode of dysarthria on 7/31/2018.

## 2018-08-02 NOTE — PROGRESS NOTES
[facility]  Vascular Neurology  Comprehensive Stroke Center  Progress Note    Assessment/Plan:     * Thrombotic stroke involving right middle cerebral artery    Thrombotic stroke involving right middle cerebral artery  Antithrombotics for secondary stroke prevention: Antiplatelets: Aspirin: 81 mg daily    Statins for secondary stroke prevention and hyperlipidemia, if present:   Statins: Atorvastatin- 40 mg daily    Aggressive risk factor modification: HTN, HLD     Rehab efforts: None: Reason: No deficits    Diagnostics ordered/pending: None     VTE prophylaxis: None: Reason for No Pharmacological VTE Prophylaxis: Ambulating with or without assistance    BP parameters: Infarct: gradually bring down to < 130/80                 No notes on file    STROKE DOCUMENTATION        NIH Scale:  Interval: 7 days or at discharge (whichever comes first)  1a. Level Of Consciousness: 0-->Alert: keenly responsive  1b. LOC Questions: 0-->Answers both questions correctly  1c. LOC Commands: 0-->Performs both tasks correctly  2. Best Gaze: 0-->Normal  3. Visual: 0-->No visual loss  4. Facial Palsy: 0-->Normal symmetrical movements  5a. Motor Arm, Left: 0-->No drift: limb holds 90 (or 45) degrees for full 10 secs  5b. Motor Arm, Right: 0-->No drift: limb holds 90 (or 45) degrees for full 10 secs  6a. Motor Leg, Left: 0-->No drift: leg holds 30 degree position for full 5 secs  6b. Motor Leg, Right: 0-->No drift: leg holds 30 degree position for full 5 secs  7. Limb Ataxia: 0-->Absent  8. Sensory: 0-->Normal: no sensory loss  9. Best Language: 0-->No aphasia: normal  10. Dysarthria: 0-->Normal  11. Extinction and Inattention (formerly Neglect): 0-->No abnormality  Total (NIH Stroke Scale): 0       Modified [place] Score: 0  [place] Coma Scale:    ABCD2 Score:    [id number] Score:   HAS -BLED Score:   ICH Score:   Hunt & Lucio Classification:      Hemorrhagic change of an Ischemic Stroke: Does this patient have an  ischemic stroke with hemorrhagic changes? No     Neurologic Chief Complaint: Stroke    Subjective:     Interval History: Patient is seen for follow-up neurological assessment and treatment recommendations:     HPI, Past Medical, Family, and Social History remains the same as documented in the initial encounter.     Review of Systems   Neurological: Positive for speech difficulty.   All other systems reviewed and are negative.    Scheduled Meds:   aspirin  81 mg Oral Daily    atorvastatin  40 mg Oral Daily    enoxaparin  40 mg Subcutaneous Daily    oxybutynin  5 mg Oral Daily    sodium chloride 0.9%  3 mL Intravenous Q8H     Continuous Infusions:  PRN Meds:labetalol, ondansetron    Objective:     Vital Signs (Most Recent):  Temp: 96.3 °F (35.7 °C) (08/01/18 1726)  Pulse: (!) 56 (08/01/18 1726)  Resp: 18 (08/01/18 1726)  BP: 134/86 (08/01/18 1726)  SpO2: 98 % (08/01/18 1550)  BP Location: Left arm    Vital Signs Range (Last 24H):  Temp:  [96.3 °F (35.7 °C)-98.3 °F (36.8 °C)]   Pulse:  [54-67]   Resp:  [14-18]   BP: (134-186)/()   SpO2:  [94 %-98 %]   BP Location: Left arm    Physical Exam   Constitutional: He is oriented to person, place, and time. He appears well-developed and well-nourished.   HENT:   Head: Normocephalic and atraumatic.   Eyes: EOM are normal. Pupils are equal, round, and reactive to light.   Cardiovascular: Normal rate and regular rhythm.    Pulmonary/Chest: Effort normal.   Neurological: He is alert and oriented to person, place, and time.   Vitals reviewed.      Neurological Exam:   LOC: alert  Language: No aphasia  Articulation: No dysarthria  Orientation: Person, Place, Time   Visual Fields: Full  EOM (CN III, IV, VI): Full/intact  Pupils (CN II, III): PERRL  Facial Sensation (CN V): Normal  Facial Movement (CN VII): Symmetric facial expression    Motor: Arm left  Normal 5/5  Leg left  Normal 5/5  Arm right  Normal 5/5  Leg right Normal 5/5  Cebellar: No evidence of appendicular or  axial ataxia  Sensation: Intact to light touch, temperature and vibration    Laboratory:  CMP:   Recent Labs  Lab 08/01/18  0457   CALCIUM 9.6   ALBUMIN 3.6   PROT 6.6      K 5.1   CO2 34*      BUN 18   CREATININE 1.0   ALKPHOS 75   ALT 12   AST 19   BILITOT 0.7     CBC:   Recent Labs  Lab 08/01/18 0457   WBC 7.34   RBC 5.14   HGB 14.9   HCT 46.1      MCV 90   MCH 29.0   MCHC 32.3     Lipid Panel:   Recent Labs  Lab 08/01/18 0457   CHOL 140   LDLCALC 80.6   HDL 41   TRIG 92     Hgb A1C:   Recent Labs  Lab 08/01/18 0457   HGBA1C 5.6       Diagnostic Results     Brain Imaging   MRI:  Acute infarction along the body of the right caudate nucleus without associated hemorrhage or abnormal mass effects.  2. Extensive periventricular white matter disease most likely representing chronic microvascular ischemia  Vessel Imaging   CTA  Acute infarction along the body of the right caudate nucleus without associated hemorrhage or abnormal mass effects.  2. Extensive periventricular white matter disease most likely representing chronic microvascular ischemia  Cardiac Imaging   TTE  1 - Normal left ventricular systolic function (EF 60-65%).     2 - Impaired LV relaxation, normal LAP (grade 1 diastolic dysfunction).     3 - Normal right ventricular systolic function .     4 - The estimated PA systolic pressure is 21 mmHg.         Nadia Arevalo MD  Comprehensive Stroke Center  Department of Vascular Neurology   [facility]

## 2018-08-02 NOTE — SUBJECTIVE & OBJECTIVE
Neurologic Chief Complaint: Stroke    Subjective:     Interval History: Patient is seen for follow-up neurological assessment and treatment recommendations:     HPI, Past Medical, Family, and Social History remains the same as documented in the initial encounter.     Review of Systems   Neurological: Positive for speech difficulty.   All other systems reviewed and are negative.    Scheduled Meds:   aspirin  81 mg Oral Daily    atorvastatin  40 mg Oral Daily    enoxaparin  40 mg Subcutaneous Daily    oxybutynin  5 mg Oral Daily    sodium chloride 0.9%  3 mL Intravenous Q8H     Continuous Infusions:  PRN Meds:labetalol, ondansetron    Objective:     Vital Signs (Most Recent):  Temp: 96.3 °F (35.7 °C) (08/01/18 1726)  Pulse: (!) 56 (08/01/18 1726)  Resp: 18 (08/01/18 1726)  BP: 134/86 (08/01/18 1726)  SpO2: 98 % (08/01/18 1550)  BP Location: Left arm    Vital Signs Range (Last 24H):  Temp:  [96.3 °F (35.7 °C)-98.3 °F (36.8 °C)]   Pulse:  [54-67]   Resp:  [14-18]   BP: (134-186)/()   SpO2:  [94 %-98 %]   BP Location: Left arm    Physical Exam   Constitutional: He is oriented to person, place, and time. He appears well-developed and well-nourished.   HENT:   Head: Normocephalic and atraumatic.   Eyes: EOM are normal. Pupils are equal, round, and reactive to light.   Cardiovascular: Normal rate and regular rhythm.    Pulmonary/Chest: Effort normal.   Neurological: He is alert and oriented to person, place, and time.   Vitals reviewed.      Neurological Exam:   LOC: alert  Language: No aphasia  Articulation: No dysarthria  Orientation: Person, Place, Time   Visual Fields: Full  EOM (CN III, IV, VI): Full/intact  Pupils (CN II, III): PERRL  Facial Sensation (CN V): Normal  Facial Movement (CN VII): Symmetric facial expression    Motor: Arm left  Normal 5/5  Leg left  Normal 5/5  Arm right  Normal 5/5  Leg right Normal 5/5  Cebellar: No evidence of appendicular or axial ataxia  Sensation: Intact to light touch,  temperature and vibration    Laboratory:  CMP:   Recent Labs  Lab 08/01/18  0457   CALCIUM 9.6   ALBUMIN 3.6   PROT 6.6      K 5.1   CO2 34*      BUN 18   CREATININE 1.0   ALKPHOS 75   ALT 12   AST 19   BILITOT 0.7     CBC:   Recent Labs  Lab 08/01/18 0457   WBC 7.34   RBC 5.14   HGB 14.9   HCT 46.1      MCV 90   MCH 29.0   MCHC 32.3     Lipid Panel:   Recent Labs  Lab 08/01/18 0457   CHOL 140   LDLCALC 80.6   HDL 41   TRIG 92     Hgb A1C:   Recent Labs  Lab 08/01/18 0457   HGBA1C 5.6       Diagnostic Results     Brain Imaging   MRI:  Acute infarction along the body of the right caudate nucleus without associated hemorrhage or abnormal mass effects.  2. Extensive periventricular white matter disease most likely representing chronic microvascular ischemia  Vessel Imaging   CTA  Acute infarction along the body of the right caudate nucleus without associated hemorrhage or abnormal mass effects.  2. Extensive periventricular white matter disease most likely representing chronic microvascular ischemia  Cardiac Imaging   TTE  1 - Normal left ventricular systolic function (EF 60-65%).     2 - Impaired LV relaxation, normal LAP (grade 1 diastolic dysfunction).     3 - Normal right ventricular systolic function .     4 - The estimated PA systolic pressure is 21 mmHg.

## 2018-08-02 NOTE — ASSESSMENT & PLAN NOTE
Thrombotic stroke involving right middle cerebral artery  Antithrombotics for secondary stroke prevention: Antiplatelets: Aspirin: 81 mg daily    Statins for secondary stroke prevention and hyperlipidemia, if present:   Statins: Atorvastatin- 40 mg daily    Aggressive risk factor modification: HTN, HLD     Rehab efforts: None: Reason: No deficits    Diagnostics ordered/pending: None     VTE prophylaxis: None: Reason for No Pharmacological VTE Prophylaxis: Ambulating with or without assistance    BP parameters: Infarct: gradually bring down to < 130/80

## 2022-04-09 ENCOUNTER — HISTORICAL (OUTPATIENT)
Dept: ADMINISTRATIVE | Facility: HOSPITAL | Age: 87
End: 2022-04-09
Payer: MEDICARE

## 2022-04-26 VITALS
SYSTOLIC BLOOD PRESSURE: 148 MMHG | BODY MASS INDEX: 23.58 KG/M2 | DIASTOLIC BLOOD PRESSURE: 90 MMHG | WEIGHT: 159.19 LBS | HEIGHT: 69 IN

## 2022-05-05 ENCOUNTER — OFFICE VISIT (OUTPATIENT)
Dept: HEMATOLOGY/ONCOLOGY | Facility: CLINIC | Age: 87
End: 2022-05-05
Payer: MEDICARE

## 2022-05-05 VITALS
BODY MASS INDEX: 22.07 KG/M2 | HEIGHT: 67 IN | TEMPERATURE: 96 F | WEIGHT: 140.63 LBS | OXYGEN SATURATION: 97 % | HEART RATE: 70 BPM | DIASTOLIC BLOOD PRESSURE: 72 MMHG | RESPIRATION RATE: 18 BRPM | SYSTOLIC BLOOD PRESSURE: 115 MMHG

## 2022-05-05 DIAGNOSIS — D50.9 MICROCYTIC ANEMIA: ICD-10-CM

## 2022-05-05 DIAGNOSIS — D72.829 LEUKOCYTOSIS, UNSPECIFIED TYPE: Primary | ICD-10-CM

## 2022-05-05 DIAGNOSIS — D47.1 MYELOPROLIFERATIVE NEOPLASM: ICD-10-CM

## 2022-05-05 LAB
BASOPHILS # BLD AUTO: 0.12 X10(3)/MCL (ref 0–0.2)
BASOPHILS NFR BLD AUTO: 0.3 %
EOSINOPHIL # BLD AUTO: 0.36 X10(3)/MCL (ref 0–0.9)
EOSINOPHIL NFR BLD AUTO: 0.9 %
ERYTHROCYTE [DISTWIDTH] IN BLOOD BY AUTOMATED COUNT: 17 % (ref 11.5–17)
HCT VFR BLD AUTO: 39 % (ref 42–52)
HGB BLD-MCNC: 12.1 GM/DL (ref 14–18)
IMM GRANULOCYTES # BLD AUTO: 5.19 X10(3)/MCL (ref 0–0.02)
IMM GRANULOCYTES NFR BLD AUTO: 12.6 % (ref 0–0.43)
LYMPHOCYTES # BLD AUTO: 2.63 X10(3)/MCL (ref 0.6–4.6)
LYMPHOCYTES NFR BLD AUTO: 6.4 %
MCH RBC QN AUTO: 24.1 PG (ref 27–31)
MCHC RBC AUTO-ENTMCNC: 31 MG/DL (ref 33–36)
MCV RBC AUTO: 77.5 FL (ref 80–94)
MONOCYTES # BLD AUTO: 7.48 X10(3)/MCL (ref 0.1–1.3)
MONOCYTES NFR BLD AUTO: 18.1 %
NEUTROPHILS # BLD AUTO: 25.6 X10(3)/MCL (ref 2.1–9.2)
NEUTROPHILS NFR BLD AUTO: 61.7 %
PLATELET # BLD AUTO: 173 X10(3)/MCL (ref 130–400)
PMV BLD AUTO: 10.1 FL (ref 9.4–12.4)
RBC # BLD AUTO: 5.03 X10(6)/MCL (ref 4.7–6.1)
WBC # SPEC AUTO: 41.4 X10(3)/MCL (ref 4.5–11.5)

## 2022-05-05 PROCEDURE — 1159F MED LIST DOCD IN RCRD: CPT | Mod: CPTII,S$GLB,, | Performed by: INTERNAL MEDICINE

## 2022-05-05 PROCEDURE — 99999 PR PBB SHADOW E&M-EST. PATIENT-LVL V: CPT | Mod: PBBFAC,,, | Performed by: INTERNAL MEDICINE

## 2022-05-05 PROCEDURE — 1160F RVW MEDS BY RX/DR IN RCRD: CPT | Mod: CPTII,S$GLB,, | Performed by: INTERNAL MEDICINE

## 2022-05-05 PROCEDURE — 85025 COMPLETE CBC W/AUTO DIFF WBC: CPT | Performed by: INTERNAL MEDICINE

## 2022-05-05 PROCEDURE — 99204 OFFICE O/P NEW MOD 45 MIN: CPT | Mod: S$GLB,,, | Performed by: INTERNAL MEDICINE

## 2022-05-05 PROCEDURE — 1126F AMNT PAIN NOTED NONE PRSNT: CPT | Mod: CPTII,S$GLB,, | Performed by: INTERNAL MEDICINE

## 2022-05-05 PROCEDURE — 1160F PR REVIEW ALL MEDS BY PRESCRIBER/CLIN PHARMACIST DOCUMENTED: ICD-10-PCS | Mod: CPTII,S$GLB,, | Performed by: INTERNAL MEDICINE

## 2022-05-05 PROCEDURE — 99999 PR PBB SHADOW E&M-EST. PATIENT-LVL V: ICD-10-PCS | Mod: PBBFAC,,, | Performed by: INTERNAL MEDICINE

## 2022-05-05 PROCEDURE — 99204 PR OFFICE/OUTPT VISIT, NEW, LEVL IV, 45-59 MIN: ICD-10-PCS | Mod: S$GLB,,, | Performed by: INTERNAL MEDICINE

## 2022-05-05 PROCEDURE — 1126F PR PAIN SEVERITY QUANTIFIED, NO PAIN PRESENT: ICD-10-PCS | Mod: CPTII,S$GLB,, | Performed by: INTERNAL MEDICINE

## 2022-05-05 PROCEDURE — 1159F PR MEDICATION LIST DOCUMENTED IN MEDICAL RECORD: ICD-10-PCS | Mod: CPTII,S$GLB,, | Performed by: INTERNAL MEDICINE

## 2022-05-05 PROCEDURE — 36415 COLL VENOUS BLD VENIPUNCTURE: CPT | Performed by: INTERNAL MEDICINE

## 2022-05-05 RX ORDER — HYDROXYUREA 500 MG/1
500 CAPSULE ORAL DAILY
Qty: 30 CAPSULE | Refills: 5 | Status: SHIPPED | OUTPATIENT
Start: 2022-05-05 | End: 2022-12-12

## 2022-05-05 RX ORDER — MELOXICAM 7.5 MG/1
7.5 TABLET ORAL DAILY
COMMUNITY
End: 2022-07-01

## 2022-05-05 RX ORDER — MIRTAZAPINE 7.5 MG/1
7.5 TABLET, FILM COATED ORAL NIGHTLY
COMMUNITY
Start: 2022-04-09

## 2022-05-05 RX ORDER — TAMSULOSIN HYDROCHLORIDE 0.4 MG/1
1 CAPSULE ORAL DAILY
COMMUNITY
Start: 2022-02-10

## 2022-05-05 RX ORDER — LANOLIN ALCOHOL/MO/W.PET/CERES
400 CREAM (GRAM) TOPICAL DAILY
COMMUNITY

## 2022-05-05 RX ORDER — ROSUVASTATIN CALCIUM 20 MG/1
20 TABLET, COATED ORAL
COMMUNITY
Start: 2022-04-22

## 2022-05-05 RX ORDER — FINASTERIDE 5 MG/1
5 TABLET, FILM COATED ORAL DAILY
COMMUNITY
Start: 2022-04-22

## 2022-05-05 NOTE — LETTER
May 5, 2022        Sinan Miller MD  [address] [address]             [facility]  [address], [address]  [facility] [address]  Phone: [phone]   Patient: Francis J Colletta   MR Number: [id number]   YOB: 1933   Date of Visit: 5/5/2022       Dear Dr. Miller:    I saw your patient, Francis Colletta, today for evaluation. Attached you will find relevant portions of my assessment and plan of care.       If you have questions, please do not hesitate to call me. I look forward to following Francis Colletta along with you.    Sincerely,      Remy Gorman MD            CC  No Recipients    Enclosure

## 2022-05-05 NOTE — PROGRESS NOTES
Subjective:       Patient ID: Francis J Colletta is a 88 y.o. male.    Chief Complaint: Abnormal bloodwork    Diagnosis: Leukocytosis    Current Treatment: New patient visit    Patient referred by his PCP for progressive leukocytosis.  Review of previous laboratory showed a WBC count averaging between 15K to 20K dating back to at least 8/19.  Differential showed a mild left shift and no absolute lymphocytosis.  CBC 03/15/22 showed a WBC count of 23.4 with a differential of 70% neutrophils, 14% lymphocytes, 10% monocytes, 2% metas and 2% myelocytes.  Hemoglobin was 14.1, hematocrit 45.7 and platelets 153. Peripheral blood flow cytometry showed no abnormal monoclonal cell populations. Bone marrow aspirate and biopsy 4/19/22 showed a hypercellular marrow with granulocytic hyperplasia and 5.8% myeloblasts.  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  Cytogenetics were normal 46, XY.  FISH for BCR-ABL was negative.  Myeloid molecular panel showed mutations of ASXL1, CBL, [id number], SETBP1, SRSF2 and GATA2.     CT chest 3/21/22 but for follow-up of pulmonary nodules showed stable, small subcentimeter pulmonary nodules and COPD changes.  There is no suspicious lymphadenopathy.    He presents today for a hematology evaluation accompanied by his wife.  He denies any fever, chills, night sweats or weight loss.  He lost weight after a AAA graft repair and never completely gained it back.  He has no history of frequent or recurrent infections.    Review of Systems   Constitutional: Negative for appetite change, fatigue and fever.   HENT: Negative for mouth sores, sore throat and trouble swallowing.    Eyes: Negative.    Respiratory: Negative for cough, chest tightness and shortness of breath.    Cardiovascular: Negative for chest pain, palpitations and leg swelling.   Gastrointestinal: Negative for abdominal distention, abdominal pain, blood in stool, change in bowel habit, constipation, diarrhea, nausea, vomiting and change in bowel habit.  "  Endocrine: Negative.    Genitourinary: Negative for dysuria, frequency and urgency.   Musculoskeletal: Positive for arthralgias and back pain.   Integumentary:  Negative for rash and mole/lesion.   Hematological: Negative for adenopathy. Bruises/bleeds easily.       PMHx:  CAD, Hyperlipidemia, CVA, osteoarthritis, pneumonia, BPH    PSHx:  Tonsils, right hip replacement, AAA repair     SH:  Former heavy pipe smoker, quit 2019. Lives in [place] with his wife, retired .    FH:  His daughter had lung cancer.    Objective:     /72 (BP Location: Right arm, Patient Position: Sitting, BP Method: Large (Manual))   Pulse 70   Temp 96.3 °F (35.7 °C)   Resp 18   Ht 5' 7.32" (1.71 m)   Wt 63.8 kg (140 lb 10.5 oz)   SpO2 97%   BMI 21.82 kg/m²    Physical Exam  Vitals reviewed.   Constitutional:       Comments: Thin white male in no acute distress   HENT:      Head: Normocephalic and atraumatic.      Nose: Nose normal.      Mouth/Throat:      Mouth: Mucous membranes are moist.      Pharynx: Oropharynx is clear. No posterior oropharyngeal erythema.   Eyes:      Extraocular Movements: Extraocular movements intact.      Conjunctiva/sclera: Conjunctivae normal.      Pupils: Pupils are equal, round, and reactive to light.   Cardiovascular:      Rate and Rhythm: Normal rate and regular rhythm.      Heart sounds: No murmur heard.  Pulmonary:      Breath sounds: Normal breath sounds.   Abdominal:      General: Abdomen is flat. Bowel sounds are normal. There is no distension.      Palpations: Abdomen is soft. There is no splenomegaly or mass.      Tenderness: There is no abdominal tenderness.   Musculoskeletal:         General: No swelling or tenderness. Normal range of motion.      Cervical back: Normal range of motion and neck supple. No tenderness.   Lymphadenopathy:      Comments: No palpable cervical, supraclavicular, axillary or inguinal adenopathy   Skin:     General: Skin is warm and dry.      " Findings: Bruising (Scattered ecchymoses on arms) present. No lesion or rash.   Neurological:      General: No focal deficit present.      Mental Status: He is alert and oriented to person, place, and time.      Cranial Nerves: No cranial nerve deficit.      Gait: Gait normal.       ECOG SCORE    1 - Restricted in strenuous activity-ambulatory and able to carry out work of a light nature         Latest Reference Range & Units 05/05/22 13:30   WBC 4.5 - 11.5 x10(3)/mcL 41.4 (H)   RBC 4.70 - 6.10 x10(6)/mcL 5.03   Hemoglobin 14.0 - 18.0 gm/dL 12.1 (L)   Hematocrit 42.0 - 52.0 % 39.0 (L)   MCV 80.0 - 94.0 fL 77.5 (L)   MCH 27.0 - 31.0 pg 24.1 (L)   MCHC 33.0 - 36.0 mg/dL 31.0 (L)   RDW 11.5 - 17.0 % 17.0   Platelets 130 - 400 x10(3)/mcL 173   MPV 9.4 - 12.4 fL 10.1   Neutrophils Relative % 61.7   Mono % % 18.1   Eosinophil % % 0.9   Basophil % % 0.3   Immature Granulocytes 0 - 0.43 % 12.6 (H)   Mono # 0.1 - 1.3 x10(3)/mcL 7.48 (H)   Eos # 0 - 0.9 x10(3)/mcL 0.36   Baso # 0 - 0.2 x10(3)/mcL 0.12   Immature Grans (Abs) 0 - 0.0155 x10(3)/mcL 5.19 (H)   (H): Data is abnormally high  (L): Data is abnormally low    Assessment:   Myeloproliferative neoplasm - unspecified  Mild anemia      Plan:   Patient has progressive leukocytosis on laboratory testing today.  He has no peripheral adenopathy, splenomegaly or evidence of B symptoms.  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  He will be initiated on cytoreductive therapy with hydroxyurea 500 mg daily.  Benefits, risks, toxicities and side effects of hydroxyurea were discussed and reviewed in detail. All questions were answered. Literature regarding the treatment plan and specific drug information was also provided.  RTC in 2 weeks for a follow-up visit with a repeat CBC, iron profile and serum ferritin level.      Rmey Gorman MD    Other Phyisicans  Dr. Sinan Miller

## 2022-05-15 NOTE — PROGRESS NOTES
Subjective:       Patient ID: Francis J Colletta is a 88 y.o. male.    Chief Complaint:  No problems with the medicine    Diagnosis:  Myeloproliferative disorder    Current Treatment: Hydroxyurea 500 mg/d (started 5/06/22)    Patient referred by his PCP for progressive leukocytosis.  Review of previous laboratory showed a WBC count averaging between 15K to 20K dating back to at least 8/19.  Differential showed a mild left shift and no absolute lymphocytosis.  CBC 03/15/22 showed a WBC count of 23.4 with a differential of 70% neutrophils, 14% lymphocytes, 10% monocytes, 2% metas and 2% myelocytes.  Hemoglobin was 14.1, hematocrit 45.7 and platelets 153. Peripheral blood flow cytometry showed no abnormal monoclonal cell populations. Bone marrow aspirate and biopsy 4/19/22 showed a hypercellular marrow with granulocytic hyperplasia and 5.8% myeloblasts.  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  Cytogenetics were normal 46, XY.  FISH for BCR-ABL was negative.  Myeloid molecular panel showed mutations of ASXL1, CBL, [id number], SETBP1, SRSF2 and GATA2.     CT chest 3/21/22 but for follow-up of pulmonary nodules showed stable, small subcentimeter pulmonary nodules and COPD changes.  There is no suspicious lymphadenopathy.    He was seen as a new patient 5/5/22 for a hematology evaluation.  Laboratory testing showed progressive leukocytosis with a white count of 41.4.  There was mild anemia with microcytic, hyperchromic red cell indices, platelet count was normal.  He had no peripheral adenopathy, splenomegaly or B symptoms.  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  He was started on cytoreductive therapy with hydroxyurea 500 mg daily.     Interval History  He returns to clinic today for a 2 week follow-up visit accompanied by his son.  He is taking Hydrea 500 mg daily in the morning.  He reports no significant side effects associated with treatment.  His WBC  "count has decreased from 41.4 to 23.3.  Hemoglobin is stable at 12.1 and platelets slightly decreased at 119. No abnormal bruising or bleeding.  He denies any fever, chills, night sweats or weight loss.      Review of Systems   Constitutional: Negative for appetite change, fatigue and fever.   HENT: Negative for mouth sores, sore throat and trouble swallowing.    Eyes: Negative.    Respiratory: Negative for cough, chest tightness and shortness of breath.    Cardiovascular: Negative for chest pain, palpitations and leg swelling.   Gastrointestinal: Negative for abdominal distention, abdominal pain, blood in stool, change in bowel habit, constipation, diarrhea, nausea, vomiting and change in bowel habit.   Endocrine: Negative.    Genitourinary: Negative for dysuria, frequency and urgency.   Musculoskeletal: Positive for arthralgias and back pain.   Integumentary:  Negative for rash and mole/lesion.   Hematological: Negative for adenopathy. Bruises/bleeds easily.       PMHx:  CAD, Hyperlipidemia, CVA, osteoarthritis, pneumonia, BPH    PSHx:  Tonsils, right hip replacement, AAA repair     SH:  Former heavy pipe smoker, quit 2019. Lives in [place] with his wife, retired .    FH:  His daughter had lung cancer.    Objective:     /85 (BP Location: Right arm, Patient Position: Sitting, BP Method: Large (Manual))   Pulse 78   Temp 96.7 °F (35.9 °C)   Resp 18   Ht 5' 7" (1.702 m)   Wt 64.1 kg (141 lb 5 oz)   SpO2 98%   BMI 22.13 kg/m²    Physical Exam  Vitals reviewed.   Constitutional:       Comments: Thin white male in no acute distress   HENT:      Head: Normocephalic and atraumatic.      Nose: Nose normal.      Mouth/Throat:      Mouth: Mucous membranes are moist.      Pharynx: Oropharynx is clear. No posterior oropharyngeal erythema.   Eyes:      Extraocular Movements: Extraocular movements intact.      Conjunctiva/sclera: Conjunctivae normal.      Pupils: Pupils are equal, round, and " reactive to light.   Cardiovascular:      Rate and Rhythm: Normal rate and regular rhythm.      Heart sounds: No murmur heard.  Pulmonary:      Breath sounds: Normal breath sounds.   Abdominal:      General: Abdomen is flat. Bowel sounds are normal. There is no distension.      Palpations: Abdomen is soft. There is no splenomegaly or mass.      Tenderness: There is no abdominal tenderness.   Musculoskeletal:         General: No swelling or tenderness. Normal range of motion.      Cervical back: Normal range of motion and neck supple. No tenderness.   Lymphadenopathy:      Comments: No palpable cervical, supraclavicular, axillary or inguinal adenopathy   Skin:     General: Skin is warm and dry.      Findings: Bruising (Scattered ecchymoses on arms) present. No lesion or rash.   Neurological:      General: No focal deficit present.      Mental Status: He is alert and oriented to person, place, and time.      Cranial Nerves: No cranial nerve deficit.      Gait: Gait normal.       ECOG SCORE    2 - Capable of all selfcare but unable to carry out any work activities, active > 50% of hours       LABORATORY    Latest Reference Range & Units 05/18/22 09:29   WBC 4.5 - 11.5 x10(3)/mcL 23.3 (H)   RBC 4.70 - 6.10 x10(6)/mcL 4.94   Hemoglobin 14.0 - 18.0 gm/dL 12.1 (L)   Hematocrit 42.0 - 52.0 % 39.2 (L)   MCV 80.0 - 94.0 fL 79.4 (L)   MCH 27.0 - 31.0 pg 24.5 (L)   MCHC 33.0 - 36.0 mg/dL 30.9 (L)   RDW 11.5 - 17.0 % 18.8 (H)   Platelets 130 - 400 x10(3)/mcL 119 (L)   MPV 9.4 - 12.4 fL 10.3   Neut % % 64.8   LYMPH % % 8.9   Mono % % 16.6   Eosinophil % % 0.6   Basophil % % 0.3   Immature Granulocytes 0 - 0.43 % 8.8 (H)   Neut # 2.1 - 9.2 x10(3)/mcL 15.1 (H)   Lymph # 0.6 - 4.6 x10(3)/mcL 2.08   Mono # 0.1 - 1.3 x10(3)/mcL 3.86 (H)   Eos # 0 - 0.9 x10(3)/mcL 0.15   Baso # 0 - 0.2 x10(3)/mcL 0.08       Assessment:   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  He is tolerating treatment well.  Continue Hydrea at the current dose.  Iron profile and serum ferritin level were drawn today.  Results pending.  RTC in 2 weeks for a follow-up visit and clinical exam with repeat laboratory.      Remy Gorman MD    Other Phyisicans  Dr. Sinan Miller

## 2022-05-18 ENCOUNTER — OFFICE VISIT (OUTPATIENT)
Dept: HEMATOLOGY/ONCOLOGY | Facility: CLINIC | Age: 87
End: 2022-05-18
Payer: MEDICARE

## 2022-05-18 ENCOUNTER — LAB VISIT (OUTPATIENT)
Dept: LAB | Facility: HOSPITAL | Age: 87
End: 2022-05-18
Attending: INTERNAL MEDICINE
Payer: MEDICARE

## 2022-05-18 VITALS
RESPIRATION RATE: 18 BRPM | TEMPERATURE: 97 F | BODY MASS INDEX: 22.18 KG/M2 | OXYGEN SATURATION: 98 % | SYSTOLIC BLOOD PRESSURE: 132 MMHG | WEIGHT: 141.31 LBS | DIASTOLIC BLOOD PRESSURE: 85 MMHG | HEIGHT: 67 IN | HEART RATE: 78 BPM

## 2022-05-18 DIAGNOSIS — D47.1 MYELOPROLIFERATIVE NEOPLASM: Primary | ICD-10-CM

## 2022-05-18 DIAGNOSIS — D72.829 LEUKOCYTOSIS, UNSPECIFIED TYPE: ICD-10-CM

## 2022-05-18 DIAGNOSIS — D72.829 LEUKOCYTOSIS, UNSPECIFIED TYPE: Primary | ICD-10-CM

## 2022-05-18 DIAGNOSIS — D50.9 MICROCYTIC ANEMIA: ICD-10-CM

## 2022-05-18 LAB
BASOPHILS # BLD AUTO: 0.08 X10(3)/MCL (ref 0–0.2)
BASOPHILS NFR BLD AUTO: 0.3 %
EOSINOPHIL # BLD AUTO: 0.15 X10(3)/MCL (ref 0–0.9)
EOSINOPHIL NFR BLD AUTO: 0.6 %
ERYTHROCYTE [DISTWIDTH] IN BLOOD BY AUTOMATED COUNT: 18.8 % (ref 11.5–17)
FERRITIN SERPL-MCNC: 183.35 NG/ML (ref 21.81–274.66)
HCT VFR BLD AUTO: 39.2 % (ref 42–52)
HGB BLD-MCNC: 12.1 GM/DL (ref 14–18)
IMM GRANULOCYTES # BLD AUTO: 2.05 X10(3)/MCL (ref 0–0.02)
IMM GRANULOCYTES NFR BLD AUTO: 8.8 % (ref 0–0.43)
IRON SATN MFR SERPL: 34 % (ref 20–50)
IRON SERPL-MCNC: 75 UG/DL (ref 65–175)
LYMPHOCYTES # BLD AUTO: 2.08 X10(3)/MCL (ref 0.6–4.6)
LYMPHOCYTES NFR BLD AUTO: 8.9 %
MCH RBC QN AUTO: 24.5 PG (ref 27–31)
MCHC RBC AUTO-ENTMCNC: 30.9 MG/DL (ref 33–36)
MCV RBC AUTO: 79.4 FL (ref 80–94)
MONOCYTES # BLD AUTO: 3.86 X10(3)/MCL (ref 0.1–1.3)
MONOCYTES NFR BLD AUTO: 16.6 %
NEUTROPHILS # BLD AUTO: 15.1 X10(3)/MCL (ref 2.1–9.2)
NEUTROPHILS NFR BLD AUTO: 64.8 %
PLATELET # BLD AUTO: 119 X10(3)/MCL (ref 130–400)
PMV BLD AUTO: 10.3 FL (ref 9.4–12.4)
RBC # BLD AUTO: 4.94 X10(6)/MCL (ref 4.7–6.1)
TIBC SERPL-MCNC: 147 UG/DL (ref 69–240)
TIBC SERPL-MCNC: 222 UG/DL (ref 250–450)
TRANSFERRIN SERPL-MCNC: 198 MG/DL
WBC # SPEC AUTO: 23.3 X10(3)/MCL (ref 4.5–11.5)

## 2022-05-18 PROCEDURE — 1160F RVW MEDS BY RX/DR IN RCRD: CPT | Mod: CPTII,S$GLB,, | Performed by: INTERNAL MEDICINE

## 2022-05-18 PROCEDURE — 99213 OFFICE O/P EST LOW 20 MIN: CPT | Mod: S$GLB,,, | Performed by: INTERNAL MEDICINE

## 2022-05-18 PROCEDURE — 3288F FALL RISK ASSESSMENT DOCD: CPT | Mod: CPTII,S$GLB,, | Performed by: INTERNAL MEDICINE

## 2022-05-18 PROCEDURE — 99999 PR PBB SHADOW E&M-EST. PATIENT-LVL IV: ICD-10-PCS | Mod: PBBFAC,,, | Performed by: INTERNAL MEDICINE

## 2022-05-18 PROCEDURE — 83540 ASSAY OF IRON: CPT

## 2022-05-18 PROCEDURE — 99999 PR PBB SHADOW E&M-EST. PATIENT-LVL IV: CPT | Mod: PBBFAC,,, | Performed by: INTERNAL MEDICINE

## 2022-05-18 PROCEDURE — 1101F PR PT FALLS ASSESS DOC 0-1 FALLS W/OUT INJ PAST YR: ICD-10-PCS | Mod: CPTII,S$GLB,, | Performed by: INTERNAL MEDICINE

## 2022-05-18 PROCEDURE — 1159F PR MEDICATION LIST DOCUMENTED IN MEDICAL RECORD: ICD-10-PCS | Mod: CPTII,S$GLB,, | Performed by: INTERNAL MEDICINE

## 2022-05-18 PROCEDURE — 1159F MED LIST DOCD IN RCRD: CPT | Mod: CPTII,S$GLB,, | Performed by: INTERNAL MEDICINE

## 2022-05-18 PROCEDURE — 1101F PT FALLS ASSESS-DOCD LE1/YR: CPT | Mod: CPTII,S$GLB,, | Performed by: INTERNAL MEDICINE

## 2022-05-18 PROCEDURE — 1160F PR REVIEW ALL MEDS BY PRESCRIBER/CLIN PHARMACIST DOCUMENTED: ICD-10-PCS | Mod: CPTII,S$GLB,, | Performed by: INTERNAL MEDICINE

## 2022-05-18 PROCEDURE — 3288F PR FALLS RISK ASSESSMENT DOCUMENTED: ICD-10-PCS | Mod: CPTII,S$GLB,, | Performed by: INTERNAL MEDICINE

## 2022-05-18 PROCEDURE — 85025 COMPLETE CBC W/AUTO DIFF WBC: CPT

## 2022-05-18 PROCEDURE — 1126F AMNT PAIN NOTED NONE PRSNT: CPT | Mod: CPTII,S$GLB,, | Performed by: INTERNAL MEDICINE

## 2022-05-18 PROCEDURE — 82728 ASSAY OF FERRITIN: CPT

## 2022-05-18 PROCEDURE — 99213 PR OFFICE/OUTPT VISIT, EST, LEVL III, 20-29 MIN: ICD-10-PCS | Mod: S$GLB,,, | Performed by: INTERNAL MEDICINE

## 2022-05-18 PROCEDURE — 1126F PR PAIN SEVERITY QUANTIFIED, NO PAIN PRESENT: ICD-10-PCS | Mod: CPTII,S$GLB,, | Performed by: INTERNAL MEDICINE

## 2022-05-18 PROCEDURE — 36415 COLL VENOUS BLD VENIPUNCTURE: CPT

## 2022-05-18 RX ORDER — VIT C/E/ZN/COPPR/LUTEIN/ZEAXAN 250MG-90MG
2000 CAPSULE ORAL DAILY
COMMUNITY

## 2022-05-18 RX ORDER — MINERAL OIL
180 ENEMA (ML) RECTAL DAILY
COMMUNITY

## 2022-05-18 RX ORDER — PEDI MULTIVIT NO.12 W-FLUORIDE 0.25 MG
1 TABLET,CHEWABLE ORAL DAILY
COMMUNITY

## 2022-05-18 RX ORDER — DOCUSATE SODIUM 250 MG
250 CAPSULE ORAL DAILY
COMMUNITY
End: 2022-10-19 | Stop reason: CLARIF

## 2022-05-30 NOTE — PROGRESS NOTES
Subjective:       Patient ID: Francis J Colletta is a 88 y.o. male.    Chief Complaint:  No problems with the medicine    Diagnosis:  Myeloproliferative disorder    Current Treatment: Hydroxyurea 500 mg/d (started 5/06/22)    Patient referred by his PCP for progressive leukocytosis.  Review of previous laboratory showed a WBC count averaging between 15K to 20K dating back to at least 8/19.  Differential showed a mild left shift and no absolute lymphocytosis.  CBC 03/15/22 showed a WBC count of 23.4 with a differential of 70% neutrophils, 14% lymphocytes, 10% monocytes, 2% metas and 2% myelocytes.  Hemoglobin was 14.1, hematocrit 45.7 and platelets 153. Peripheral blood flow cytometry showed no abnormal monoclonal cell populations. Bone marrow aspirate and biopsy 4/19/22 showed a hypercellular marrow with granulocytic hyperplasia and 5.8% myeloblasts.  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  Cytogenetics were normal 46, XY.  FISH for BCR-ABL was negative.  Myeloid molecular panel showed mutations of ASXL1, CBL, POCP231, SETBP1, SRSF2 and GATA2.     CT chest 3/21/22 but for follow-up of pulmonary nodules showed stable, small subcentimeter pulmonary nodules and COPD changes.  There is no suspicious lymphadenopathy.    He was seen as a new patient 5/5/22 for a hematology evaluation.  Laboratory testing showed progressive leukocytosis with a white count of 41.4.  There was mild anemia with microcytic, hyperchromic red cell indices, platelet count was normal.  He had no peripheral adenopathy, splenomegaly or B symptoms.  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  He was started on cytoreductive therapy with hydroxyurea 500 mg daily.     Interval History  Mr. Colletta returns to clinic today for a 2 week follow-up visit.  He has completed 4 weeks of treatment with Hydrea 500 mg daily.  He denies any treatment related side effects.  His appetite is still  "poor, but he is maintaining his weight.  He makes sure not to skip meals.  He denies any night sweats, and has no adenopathy or splenomegaly on exam.  Laboratory in the office today shows further reduction in his WBC to 18 (previously 23.3) and platelets to 74.  Hemoglobin is stable.  He has minor bruising, but no overt bleeding.  Results were discussed today with the patient and his son.    Review of Systems   Constitutional: Negative for appetite change, fatigue and fever.   HENT: Negative for mouth sores, sore throat and trouble swallowing.    Eyes: Negative.    Respiratory: Negative for cough, chest tightness and shortness of breath.    Cardiovascular: Negative for chest pain, palpitations and leg swelling.   Gastrointestinal: Negative for abdominal distention, abdominal pain, blood in stool, change in bowel habit, constipation, diarrhea, nausea, vomiting and change in bowel habit.   Endocrine: Negative.    Genitourinary: Negative for dysuria, frequency and urgency.   Musculoskeletal: Positive for arthralgias and back pain.   Integumentary:  Negative for rash and mole/lesion.   Hematological: Negative for adenopathy. Bruises/bleeds easily.       PMHx:  CAD, Hyperlipidemia, CVA, osteoarthritis, pneumonia, BPH    PSHx:  Tonsils, right hip replacement, AAA repair     SH:  Former heavy pipe smoker, quit 2019. Lives in [place] with his wife, retired .    FH:  His daughter had lung cancer.    Objective:     /85 (BP Location: Right arm, Patient Position: Sitting, BP Method: Large (Manual))   Pulse 86   Temp 97.9 °F (36.6 °C)   Resp 18   Ht 5' 7" (1.702 m)   Wt 64 kg (141 lb)   SpO2 98%   BMI 22.08 kg/m²    Physical Exam  Vitals reviewed.   Constitutional:       Comments: Thin white male in no acute distress   HENT:      Head: Normocephalic and atraumatic.      Nose: Nose normal.      Mouth/Throat:      Mouth: Mucous membranes are moist.      Pharynx: Oropharynx is clear. No posterior " oropharyngeal erythema.   Eyes:      Extraocular Movements: Extraocular movements intact.      Conjunctiva/sclera: Conjunctivae normal.      Pupils: Pupils are equal, round, and reactive to light.   Cardiovascular:      Rate and Rhythm: Normal rate and regular rhythm.      Heart sounds: No murmur heard.  Pulmonary:      Breath sounds: Normal breath sounds.   Abdominal:      General: Abdomen is flat. Bowel sounds are normal. There is no distension.      Palpations: Abdomen is soft. There is no splenomegaly or mass.      Tenderness: There is no abdominal tenderness.   Musculoskeletal:         General: No swelling or tenderness. Normal range of motion.      Cervical back: Normal range of motion and neck supple. No tenderness.   Lymphadenopathy:      Comments: No palpable cervical, supraclavicular, axillary or inguinal adenopathy   Skin:     General: Skin is warm and dry.      Findings: Bruising (Scattered ecchymoses on arms) present. No lesion or rash.   Neurological:      General: No focal deficit present.      Mental Status: He is alert and oriented to person, place, and time.      Cranial Nerves: No cranial nerve deficit.      Gait: Gait normal.       ECOG SCORE    1 - Restricted in strenuous activity-ambulatory and able to carry out work of a light nature         Lab Results   Component Value Date/Time    WBC 18.0 (H) 05/31/2022 01:44 PM    WBC 7.34 08/01/2018 04:57 AM    RBC 4.89 05/31/2022 01:44 PM    RBC 5.14 08/01/2018 04:57 AM    HGB 12.4 (L) 05/31/2022 01:44 PM    HGB 14.9 08/01/2018 04:57 AM    HCT 39.7 (L) 05/31/2022 01:44 PM    HCT 46.1 08/01/2018 04:57 AM    PLT 74 (L) 05/31/2022 01:44 PM     08/01/2018 04:57 AM    ABSNEUTRO 11.3 (H) 05/31/2022 01:44 PM    NEUTROAUTO 62.9 05/31/2022 01:44 PM    ABSLYMPH 1.88 05/31/2022 01:44 PM    LYMPHAUTO 10.4 05/31/2022 01:44 PM    ABSEOS 0.08 05/31/2022 01:44 PM    EOSAUTO 0.4 05/31/2022 01:44 PM    ABSBASO 0.09 05/31/2022 01:44 PM    BASOPHILAUTO 0.5  05/31/2022 01:44 PM        Chemistry        Component Value Date/Time     05/31/2022 1344     08/01/2018 0457    K 5.2 (H) 05/31/2022 1344    K 5.1 08/01/2018 0457     08/01/2018 0457    CO2 29 05/31/2022 1344    CO2 34 (H) 08/01/2018 0457    BUN 26.6 (H) 05/31/2022 1344    BUN 18 08/01/2018 0457    CREATININE 1.55 (H) 05/31/2022 1344    CREATININE 1.0 08/01/2018 0457    GLU 93 08/01/2018 0457        Component Value Date/Time    CALCIUM 9.0 05/31/2022 1344    CALCIUM 9.6 08/01/2018 0457    ALKPHOS 94 05/31/2022 1344    ALKPHOS 75 08/01/2018 0457    AST 18 05/31/2022 1344    AST 19 08/01/2018 0457    ALT 10 05/31/2022 1344    ALT 12 08/01/2018 0457    BILITOT 0.7 05/31/2022 1344    BILITOT 0.7 08/01/2018 0457    ESTGFRAFRICA >60 08/01/2018 0457    EGFRNONAA 45 05/31/2022 1344    EGFRNONAA >60 08/01/2018 0457          Assessment:   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  Unfortunately he is having progressive thrombocytopenia.  Decrease Hydrea to 500 mg every other day.  Return to clinic in 3 weeks for follow-up with CBC.  Patient and his son are in agreement with the treatment plan as outlined above.  All questions answered.    Plan of care formulated with Dr. Gorman.  TAHIR MAHONEY, APRN, FNP-C    Other Phyisicans  Dr. Sinan Miller

## 2022-05-31 ENCOUNTER — LAB VISIT (OUTPATIENT)
Dept: LAB | Facility: HOSPITAL | Age: 87
End: 2022-05-31
Attending: INTERNAL MEDICINE
Payer: MEDICARE

## 2022-05-31 ENCOUNTER — OFFICE VISIT (OUTPATIENT)
Dept: HEMATOLOGY/ONCOLOGY | Facility: CLINIC | Age: 87
End: 2022-05-31
Payer: MEDICARE

## 2022-05-31 VITALS
HEART RATE: 86 BPM | WEIGHT: 141 LBS | BODY MASS INDEX: 22.13 KG/M2 | RESPIRATION RATE: 18 BRPM | DIASTOLIC BLOOD PRESSURE: 85 MMHG | OXYGEN SATURATION: 98 % | TEMPERATURE: 98 F | SYSTOLIC BLOOD PRESSURE: 124 MMHG | HEIGHT: 67 IN

## 2022-05-31 DIAGNOSIS — D47.1 MYELOPROLIFERATIVE NEOPLASM: ICD-10-CM

## 2022-05-31 DIAGNOSIS — D72.829 LEUKOCYTOSIS, UNSPECIFIED TYPE: ICD-10-CM

## 2022-05-31 DIAGNOSIS — D47.1 MYELOPROLIFERATIVE DISORDER: Primary | ICD-10-CM

## 2022-05-31 LAB
ALBUMIN SERPL-MCNC: 3.7 GM/DL (ref 3.4–4.8)
ALBUMIN/GLOB SERPL: 1.4 RATIO (ref 1.1–2)
ALP SERPL-CCNC: 94 UNIT/L (ref 40–150)
ALT SERPL-CCNC: 10 UNIT/L (ref 0–55)
AST SERPL-CCNC: 18 UNIT/L (ref 5–34)
BASOPHILS # BLD AUTO: 0.09 X10(3)/MCL (ref 0–0.2)
BASOPHILS NFR BLD AUTO: 0.5 %
BILIRUBIN DIRECT+TOT PNL SERPL-MCNC: 0.7 MG/DL
BUN SERPL-MCNC: 26.6 MG/DL (ref 8.4–25.7)
CALCIUM SERPL-MCNC: 9 MG/DL (ref 8.8–10)
CHLORIDE SERPL-SCNC: 104 MMOL/L (ref 98–107)
CO2 SERPL-SCNC: 29 MMOL/L (ref 23–31)
CREAT SERPL-MCNC: 1.55 MG/DL (ref 0.73–1.18)
EOSINOPHIL # BLD AUTO: 0.08 X10(3)/MCL (ref 0–0.9)
EOSINOPHIL NFR BLD AUTO: 0.4 %
ERYTHROCYTE [DISTWIDTH] IN BLOOD BY AUTOMATED COUNT: 21.4 % (ref 11.5–17)
GLOBULIN SER-MCNC: 2.6 GM/DL (ref 2.4–3.5)
GLUCOSE SERPL-MCNC: 85 MG/DL (ref 82–115)
HCT VFR BLD AUTO: 39.7 % (ref 42–52)
HGB BLD-MCNC: 12.4 GM/DL (ref 14–18)
IMM GRANULOCYTES # BLD AUTO: 0.49 X10(3)/MCL (ref 0–0.02)
IMM GRANULOCYTES NFR BLD AUTO: 2.7 % (ref 0–0.43)
LDH SERPL-CCNC: 182 U/L (ref 125–220)
LYMPHOCYTES # BLD AUTO: 1.88 X10(3)/MCL (ref 0.6–4.6)
LYMPHOCYTES NFR BLD AUTO: 10.4 %
MCH RBC QN AUTO: 25.4 PG (ref 27–31)
MCHC RBC AUTO-ENTMCNC: 31.2 MG/DL (ref 33–36)
MCV RBC AUTO: 81.2 FL (ref 80–94)
MONOCYTES # BLD AUTO: 4.16 X10(3)/MCL (ref 0.1–1.3)
MONOCYTES NFR BLD AUTO: 23.1 %
NEUTROPHILS # BLD AUTO: 11.3 X10(3)/MCL (ref 2.1–9.2)
NEUTROPHILS NFR BLD AUTO: 62.9 %
PLATELET # BLD AUTO: 74 X10(3)/MCL (ref 130–400)
PMV BLD AUTO: 10.3 FL (ref 9.4–12.4)
POTASSIUM SERPL-SCNC: 5.2 MMOL/L (ref 3.5–5.1)
PROT SERPL-MCNC: 6.3 GM/DL (ref 5.8–7.6)
RBC # BLD AUTO: 4.89 X10(6)/MCL (ref 4.7–6.1)
SODIUM SERPL-SCNC: 139 MMOL/L (ref 136–145)
WBC # SPEC AUTO: 18 X10(3)/MCL (ref 4.5–11.5)

## 2022-05-31 PROCEDURE — 1160F RVW MEDS BY RX/DR IN RCRD: CPT | Mod: CPTII,S$GLB,, | Performed by: NURSE PRACTITIONER

## 2022-05-31 PROCEDURE — 1126F PR PAIN SEVERITY QUANTIFIED, NO PAIN PRESENT: ICD-10-PCS | Mod: CPTII,S$GLB,, | Performed by: NURSE PRACTITIONER

## 2022-05-31 PROCEDURE — 1126F AMNT PAIN NOTED NONE PRSNT: CPT | Mod: CPTII,S$GLB,, | Performed by: NURSE PRACTITIONER

## 2022-05-31 PROCEDURE — 99999 PR PBB SHADOW E&M-EST. PATIENT-LVL IV: CPT | Mod: PBBFAC,,, | Performed by: NURSE PRACTITIONER

## 2022-05-31 PROCEDURE — 99999 PR PBB SHADOW E&M-EST. PATIENT-LVL IV: ICD-10-PCS | Mod: PBBFAC,,, | Performed by: NURSE PRACTITIONER

## 2022-05-31 PROCEDURE — 1159F MED LIST DOCD IN RCRD: CPT | Mod: CPTII,S$GLB,, | Performed by: NURSE PRACTITIONER

## 2022-05-31 PROCEDURE — 80053 COMPREHEN METABOLIC PANEL: CPT

## 2022-05-31 PROCEDURE — 1159F PR MEDICATION LIST DOCUMENTED IN MEDICAL RECORD: ICD-10-PCS | Mod: CPTII,S$GLB,, | Performed by: NURSE PRACTITIONER

## 2022-05-31 PROCEDURE — 83615 LACTATE (LD) (LDH) ENZYME: CPT

## 2022-05-31 PROCEDURE — 36415 COLL VENOUS BLD VENIPUNCTURE: CPT

## 2022-05-31 PROCEDURE — 85025 COMPLETE CBC W/AUTO DIFF WBC: CPT

## 2022-05-31 PROCEDURE — 99214 PR OFFICE/OUTPT VISIT, EST, LEVL IV, 30-39 MIN: ICD-10-PCS | Mod: S$GLB,,, | Performed by: NURSE PRACTITIONER

## 2022-05-31 PROCEDURE — 99214 OFFICE O/P EST MOD 30 MIN: CPT | Mod: S$GLB,,, | Performed by: NURSE PRACTITIONER

## 2022-05-31 PROCEDURE — 1160F PR REVIEW ALL MEDS BY PRESCRIBER/CLIN PHARMACIST DOCUMENTED: ICD-10-PCS | Mod: CPTII,S$GLB,, | Performed by: NURSE PRACTITIONER

## 2022-07-01 ENCOUNTER — OFFICE VISIT (OUTPATIENT)
Dept: HEMATOLOGY/ONCOLOGY | Facility: CLINIC | Age: 87
End: 2022-07-01
Payer: MEDICARE

## 2022-07-01 ENCOUNTER — LAB VISIT (OUTPATIENT)
Dept: LAB | Facility: HOSPITAL | Age: 87
End: 2022-07-01
Attending: INTERNAL MEDICINE
Payer: MEDICARE

## 2022-07-01 VITALS
HEIGHT: 67 IN | TEMPERATURE: 98 F | SYSTOLIC BLOOD PRESSURE: 98 MMHG | HEART RATE: 84 BPM | WEIGHT: 144.19 LBS | DIASTOLIC BLOOD PRESSURE: 62 MMHG | BODY MASS INDEX: 22.63 KG/M2

## 2022-07-01 DIAGNOSIS — D47.1 MYELOPROLIFERATIVE DISORDER: Primary | ICD-10-CM

## 2022-07-01 DIAGNOSIS — D47.1 MYELOPROLIFERATIVE DISORDER: ICD-10-CM

## 2022-07-01 LAB
BASOPHILS # BLD AUTO: 0.15 X10(3)/MCL (ref 0–0.2)
BASOPHILS NFR BLD AUTO: 0.4 %
EOSINOPHIL # BLD AUTO: 0.29 X10(3)/MCL (ref 0–0.9)
EOSINOPHIL NFR BLD AUTO: 0.8 %
ERYTHROCYTE [DISTWIDTH] IN BLOOD BY AUTOMATED COUNT: 20.8 % (ref 11.5–17)
HCT VFR BLD AUTO: 40.5 % (ref 42–52)
HGB BLD-MCNC: 12.7 GM/DL (ref 14–18)
IMM GRANULOCYTES # BLD AUTO: 2 X10(3)/MCL (ref 0–0.04)
IMM GRANULOCYTES NFR BLD AUTO: 5.4 %
LYMPHOCYTES # BLD AUTO: 2.4 X10(3)/MCL (ref 0.6–4.6)
LYMPHOCYTES NFR BLD AUTO: 6.5 %
MCH RBC QN AUTO: 26.6 PG (ref 27–31)
MCHC RBC AUTO-ENTMCNC: 31.4 MG/DL (ref 33–36)
MCV RBC AUTO: 84.7 FL (ref 80–94)
MONOCYTES # BLD AUTO: 5.77 X10(3)/MCL (ref 0.1–1.3)
MONOCYTES NFR BLD AUTO: 15.6 %
NEUTROPHILS # BLD AUTO: 26.5 X10(3)/MCL (ref 2.1–9.2)
NEUTROPHILS NFR BLD AUTO: 71.3 %
PLATELET # BLD AUTO: 76 X10(3)/MCL (ref 130–400)
PMV BLD AUTO: 10 FL (ref 7.4–10.4)
RBC # BLD AUTO: 4.78 X10(6)/MCL (ref 4.7–6.1)
WBC # SPEC AUTO: 37.1 X10(3)/MCL (ref 4.5–11.5)

## 2022-07-01 PROCEDURE — 1160F PR REVIEW ALL MEDS BY PRESCRIBER/CLIN PHARMACIST DOCUMENTED: ICD-10-PCS | Mod: CPTII,S$GLB,, | Performed by: NURSE PRACTITIONER

## 2022-07-01 PROCEDURE — 85025 COMPLETE CBC W/AUTO DIFF WBC: CPT

## 2022-07-01 PROCEDURE — 99999 PR PBB SHADOW E&M-EST. PATIENT-LVL IV: CPT | Mod: PBBFAC,,, | Performed by: NURSE PRACTITIONER

## 2022-07-01 PROCEDURE — 36415 COLL VENOUS BLD VENIPUNCTURE: CPT

## 2022-07-01 PROCEDURE — 1160F RVW MEDS BY RX/DR IN RCRD: CPT | Mod: CPTII,S$GLB,, | Performed by: NURSE PRACTITIONER

## 2022-07-01 PROCEDURE — 99214 PR OFFICE/OUTPT VISIT, EST, LEVL IV, 30-39 MIN: ICD-10-PCS | Mod: S$GLB,,, | Performed by: NURSE PRACTITIONER

## 2022-07-01 PROCEDURE — 1159F PR MEDICATION LIST DOCUMENTED IN MEDICAL RECORD: ICD-10-PCS | Mod: CPTII,S$GLB,, | Performed by: NURSE PRACTITIONER

## 2022-07-01 PROCEDURE — 99214 OFFICE O/P EST MOD 30 MIN: CPT | Mod: S$GLB,,, | Performed by: NURSE PRACTITIONER

## 2022-07-01 PROCEDURE — 1126F PR PAIN SEVERITY QUANTIFIED, NO PAIN PRESENT: ICD-10-PCS | Mod: CPTII,S$GLB,, | Performed by: NURSE PRACTITIONER

## 2022-07-01 PROCEDURE — 1159F MED LIST DOCD IN RCRD: CPT | Mod: CPTII,S$GLB,, | Performed by: NURSE PRACTITIONER

## 2022-07-01 PROCEDURE — 99999 PR PBB SHADOW E&M-EST. PATIENT-LVL IV: ICD-10-PCS | Mod: PBBFAC,,, | Performed by: NURSE PRACTITIONER

## 2022-07-01 PROCEDURE — 1126F AMNT PAIN NOTED NONE PRSNT: CPT | Mod: CPTII,S$GLB,, | Performed by: NURSE PRACTITIONER

## 2022-07-01 NOTE — PROGRESS NOTES
Subjective:       Patient ID: Francis J Colletta is a 88 y.o. male.    Chief Complaint:  What does the lab show?    Diagnosis:  Myeloproliferative disorder    Current Treatment: Hydroxyurea 500 mg/d (started 5/06/22) -->QOD 5/31/22    Patient referred by his PCP for progressive leukocytosis.  Review of previous laboratory showed a WBC count averaging between 15K to 20K dating back to at least 8/19.  Differential showed a mild left shift and no absolute lymphocytosis.  CBC 03/15/22 showed a WBC count of 23.4 with a differential of 70% neutrophils, 14% lymphocytes, 10% monocytes, 2% metas and 2% myelocytes.  Hemoglobin was 14.1, hematocrit 45.7 and platelets 153. Peripheral blood flow cytometry showed no abnormal monoclonal cell populations. Bone marrow aspirate and biopsy 4/19/22 showed a hypercellular marrow with granulocytic hyperplasia and 5.8% myeloblasts.  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  Cytogenetics were normal 46, XY.  FISH for BCR-ABL was negative.  Myeloid molecular panel showed mutations of ASXL1, CBL, TLJO599, SETBP1, SRSF2 and GATA2.     CT chest 3/21/22 but for follow-up of pulmonary nodules showed stable, small subcentimeter pulmonary nodules and COPD changes.  There is no suspicious lymphadenopathy.    He was seen as a new patient 5/5/22 for a hematology evaluation.  Laboratory testing showed progressive leukocytosis with a white count of 41.4.  There was mild anemia with microcytic, hyperchromic red cell indices, platelet count was normal.  He had no peripheral adenopathy, splenomegaly or B symptoms.  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  He was started on cytoreductive therapy with hydroxyurea 500 mg daily.     Interval History  Patient is here today in follow-up accompanied by his wife.  At the time of his last office visit 5/31/22, Hydrea was reduced to 500 mg every other day secondary to thrombocytopenia (platelet  count 87218).  He returns today to recheck his blood counts.  WBC has increased to 37.1.  [place] [address]. He has stable fatigue.  His appetite is still decreased but he has gained weight.  No night sweats or abdominal pain.  No episodes of bleeding just expected bruising of the extremities.  Laboratory findings reviewed and discussed with the patient and his wife.  A copy of his laboratory was provided    Review of Systems   Constitutional: Negative for appetite change, fatigue and fever.   HENT: Negative for mouth sores, sore throat and trouble swallowing.    Eyes: Negative.    Respiratory: Negative for cough, chest tightness and shortness of breath.    Cardiovascular: Negative for chest pain, palpitations and leg swelling.   Gastrointestinal: Negative for abdominal distention, abdominal pain, blood in stool, change in bowel habit, constipation, diarrhea, nausea, vomiting and change in bowel habit.   Endocrine: Negative.    Genitourinary: Negative for dysuria, frequency and urgency.   Musculoskeletal: Positive for arthralgias and back pain.   Integumentary:  Negative for rash and mole/lesion.   Hematological: Negative for adenopathy. Bruises/bleeds easily.       PMHx:  CAD, Hyperlipidemia, CVA, osteoarthritis, pneumonia, BPH    PSHx:  Tonsils, right hip replacement, AAA repair     SH:  Former heavy pipe smoker, quit 2019. Lives in [place] with his wife, retired .    FH:  His daughter had lung cancer.    Objective:        Physical Exam  Vitals reviewed.   Constitutional:       Comments: Thin white male in no acute distress   HENT:      Head: Normocephalic and atraumatic.      Nose: Nose normal.      Mouth/Throat:      Mouth: Mucous membranes are moist.      Pharynx: Oropharynx is clear. No posterior oropharyngeal erythema.   Eyes:      Extraocular Movements: Extraocular movements intact.      Conjunctiva/sclera: Conjunctivae normal.      Pupils: Pupils are equal, round, and reactive to  light.   Cardiovascular:      Rate and Rhythm: Normal rate and regular rhythm.      Heart sounds: No murmur heard.  Pulmonary:      Breath sounds: Normal breath sounds.   Abdominal:      General: Abdomen is flat. Bowel sounds are normal. There is no distension.      Palpations: Abdomen is soft. There is no splenomegaly or mass.      Tenderness: There is no abdominal tenderness.   Musculoskeletal:         General: No swelling or tenderness. Normal range of motion.      Cervical back: Normal range of motion and neck supple. No tenderness.   Lymphadenopathy:      Comments: No palpable cervical, supraclavicular, axillary or inguinal adenopathy   Skin:     General: Skin is warm and dry.      Findings: Bruising (Scattered ecchymoses on arms) present. No lesion or rash.   Neurological:      General: No focal deficit present.      Mental Status: He is alert and oriented to person, place, and time.      Cranial Nerves: No cranial nerve deficit.      Gait: Gait normal.       ECOG SCORE    1 - Restricted in strenuous activity-ambulatory and able to carry out work of a light nature         Recent Results (from the past 336 hour(s))   CBC with Differential    Collection Time: 07/01/22 10:03 AM   Result Value Ref Range    WBC 37.1 (H) 4.5 - 11.5 x10(3)/mcL    RBC 4.78 4.70 - 6.10 x10(6)/mcL    Hgb 12.7 (L) 14.0 - 18.0 gm/dL    Hct 40.5 (L) 42.0 - 52.0 %    MCV 84.7 80.0 - 94.0 fL    MCH 26.6 (L) 27.0 - 31.0 pg    MCHC 31.4 (L) 33.0 - 36.0 mg/dL    RDW 20.8 (H) 11.5 - 17.0 %    Platelet 76 (L) 130 - 400 x10(3)/mcL    MPV 10.0 7.4 - 10.4 fL    Neut % 71.3 %    Lymph % 6.5 %    Mono % 15.6 %    Eos % 0.8 %    Basophil % 0.4 %    Lymph # 2.40 0.6 - 4.6 x10(3)/mcL    Neut # 26.5 (H) 2.1 - 9.2 x10(3)/mcL    Mono # 5.77 (H) 0.1 - 1.3 x10(3)/mcL    Eos # 0.29 0 - 0.9 x10(3)/mcL    Baso # 0.15 0 - 0.2 x10(3)/mcL    IG# 2.00 (H) 0 - 0.04 x10(3)/mcL    IG% 5.4 %          Assessment:   Myeloproliferative neoplasm - unspecified (suspicious  for CMML)  Mild anemia and thrombocytopenia      Plan:   Patient is tolerating Hydrea well.  He has stable thrombocytopenia, but unfortunately WBC shows interval increase at the reduced dose Hydrea.  Case discussed with Dr. Hollins.  Continue Hydrea at 500 mg every other day.  Repeat CBC in 3 weeks.  Return to clinic in 6 weeks for follow-up visit, or sooner if needed.  All questions answered to the satisfaction of the patient and his wife.      Plan of care formulated with Dr. Hollins.  KIRA STREETER, FNP-C    Other Phyisicans  Dr. Sinan Miller

## 2022-07-22 ENCOUNTER — APPOINTMENT (OUTPATIENT)
Dept: LAB | Facility: HOSPITAL | Age: 87
End: 2022-07-22
Attending: INTERNAL MEDICINE
Payer: MEDICARE

## 2022-08-22 NOTE — PROGRESS NOTES
Subjective:       Patient ID: Francis J Colletta is a 88 y.o. male.    Chief Complaint:  Anxious for lab results    Diagnosis:  Myeloproliferative disorder    Current Treatment: Hydroxyurea 500 mg/d (started 5/06/22) -->QOD 5/31/22 -> 6 days on/1 day off beginning 7/22/22    Patient referred by his PCP for progressive leukocytosis.  Review of previous laboratory showed a WBC count averaging between 15K to 20K dating back to at least 8/19.  Differential showed a mild left shift and no absolute lymphocytosis.  CBC 03/15/22 showed a WBC count of 23.4 with a differential of 70% neutrophils, 14% lymphocytes, 10% monocytes, 2% metas and 2% myelocytes.  Hemoglobin was 14.1, hematocrit 45.7 and platelets 153. Peripheral blood flow cytometry showed no abnormal monoclonal cell populations. Bone marrow aspirate and biopsy 4/19/22 showed a hypercellular marrow with granulocytic hyperplasia and 5.8% myeloblasts.  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  Cytogenetics were normal 46, XY.  FISH for BCR-ABL was negative.  Myeloid molecular panel showed mutations of ASXL1, CBL, HKAC606, SETBP1, SRSF2 and GATA2.     CT chest 3/21/22 but for follow-up of pulmonary nodules showed stable, small subcentimeter pulmonary nodules and COPD changes.  There is no suspicious lymphadenopathy.    He was seen as a new patient 5/5/22 for a hematology evaluation.  Laboratory testing showed progressive leukocytosis with a white count of 41.4.  There was mild anemia with microcytic, hyperchromic red cell indices, platelet count was normal.  He had no peripheral adenopathy, splenomegaly or B symptoms.  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  He was started on cytoreductive therapy with hydroxyurea 500 mg daily.       WBC improved, but he developed thrombocytopenia with a platelet count of 59[address].  Hydrea was reduced to 500 mg every other day.  His thrombocytopenia remains stable, but he  had recurrent leukocytosis.  He was eventually changed to Hydrea 500 mg 6 days on 1 day off 7/22/22.      Interval History  Mr. Colletta is here today to check his blood counts accompanied by his son Mahesh.  He is ambulatory without assistance.  He is taking his Hydrea 6 days on, 1 day off as directed since 7/22/22.  His platelet count has improved to 96,000.  He has minor bruising but no bleeding.  WBC has improved from 48 to 31.  He has no associated fevers or sweats.  He has ongoing mild anorexia and difficulty gaining weight.  He make sure to eat several meals daily and is also utilizing supplements.  He has no palpable adenopathy or splenomegaly on exam.    Review of Systems   Constitutional: Positive for appetite change. Negative for fatigue and fever.   HENT: Negative for mouth sores, sore throat and trouble swallowing.    Eyes: Negative.    Respiratory: Negative for cough, chest tightness and shortness of breath.    Cardiovascular: Negative for chest pain, palpitations and leg swelling.   Gastrointestinal: Negative for abdominal distention, abdominal pain, blood in stool, change in bowel habit, constipation, diarrhea, nausea, vomiting and change in bowel habit.   Endocrine: Negative.    Genitourinary: Negative for dysuria, frequency and urgency.   Musculoskeletal: Positive for arthralgias and back pain.   Integumentary:  Negative for rash and mole/lesion.   Hematological: Negative for adenopathy. Bruises/bleeds easily.       PMHx:  CAD, Hyperlipidemia, CVA, osteoarthritis, pneumonia, BPH    PSHx:  Tonsils, right hip replacement, AAA repair     SH:  Former heavy pipe smoker, quit 2019. Lives in [place] with his wife, retired .    FH:  His daughter had lung cancer.    Objective:        Physical Exam  Vitals reviewed.   Constitutional:       Comments: Thin white male in no acute distress   HENT:      Head: Normocephalic and atraumatic.      Nose: Nose normal.      Mouth/Throat:      Mouth:  Mucous membranes are moist.      Pharynx: Oropharynx is clear. No posterior oropharyngeal erythema.   Eyes:      Extraocular Movements: Extraocular movements intact.      Conjunctiva/sclera: Conjunctivae normal.      Pupils: Pupils are equal, round, and reactive to light.   Cardiovascular:      Rate and Rhythm: Normal rate and regular rhythm.      Heart sounds: No murmur heard.  Pulmonary:      Breath sounds: Normal breath sounds.   Abdominal:      General: Abdomen is flat. Bowel sounds are normal. There is no distension.      Palpations: Abdomen is soft. There is no splenomegaly or mass.      Tenderness: There is no abdominal tenderness.   Musculoskeletal:         General: No swelling or tenderness. Normal range of motion.      Cervical back: Normal range of motion and neck supple. No tenderness.   Lymphadenopathy:      Comments: No palpable cervical, supraclavicular, axillary or inguinal adenopathy   Skin:     General: Skin is warm and dry.      Findings: Bruising (Scattered ecchymoses on arms) present. No lesion or rash.   Neurological:      General: No focal deficit present.      Mental Status: He is alert and oriented to person, place, and time.      Cranial Nerves: No cranial nerve deficit.      Gait: Gait normal.       ECOG SCORE           Recent Results (from the past 336 hour(s))   CBC with Differential    Collection Time: 08/24/22  8:58 AM   Result Value Ref Range    WBC 31.0 (H) 4.5 - 11.5 x10(3)/mcL    RBC 4.83 4.70 - 6.10 x10(6)/mcL    Hgb 13.2 (L) 14.0 - 18.0 gm/dL    Hct 43.0 42.0 - 52.0 %    MCV 89.0 80.0 - 94.0 fL    MCH 27.3 27.0 - 31.0 pg    MCHC 30.7 (L) 33.0 - 36.0 mg/dL    RDW 17.2 (H) 11.5 - 17.0 %    Platelet 96 (L) 130 - 400 x10(3)/mcL    MPV 10.8 (H) 7.4 - 10.4 fL    Neut % 71.0 %    Lymph % 6.1 %    Mono % 16.8 %    Eos % 0.6 %    Basophil % 0.6 %    Lymph # 1.89 0.6 - 4.6 x10(3)/mcL    Neut # 22.0 (H) 2.1 - 9.2 x10(3)/mcL    Mono # 5.22 (H) 0.1 - 1.3 x10(3)/mcL    Eos # 0.20 0 - 0.9  x10(3)/mcL    Baso # 0.18 0 - 0.2 x10(3)/mcL    IG# 1.52 (H) 0 - 0.04 x10(3)/mcL    IG% 4.9 %          Assessment:   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  Continue current treatment regimen.  Patient to report progressive bruising and/or bleeding.    Return to clinic in 6 weeks for follow-up with same day CBC, or sooner if needed.    Patient was provided a copy of his laboratory results.    All questions answered to the satisfaction of the patient and his son.    KIRA STREETER, FNP-C    Other Phyisicans  Dr. Sinan Miller

## 2022-08-24 ENCOUNTER — OFFICE VISIT (OUTPATIENT)
Dept: HEMATOLOGY/ONCOLOGY | Facility: CLINIC | Age: 87
End: 2022-08-24
Payer: MEDICARE

## 2022-08-24 VITALS
DIASTOLIC BLOOD PRESSURE: 81 MMHG | RESPIRATION RATE: 18 BRPM | TEMPERATURE: 98 F | HEIGHT: 69 IN | BODY MASS INDEX: 20.68 KG/M2 | SYSTOLIC BLOOD PRESSURE: 116 MMHG | HEART RATE: 81 BPM | WEIGHT: 139.63 LBS | OXYGEN SATURATION: 97 %

## 2022-08-24 DIAGNOSIS — D47.1 MYELOPROLIFERATIVE DISORDER: Primary | ICD-10-CM

## 2022-08-24 LAB
BASOPHILS # BLD AUTO: 0.18 X10(3)/MCL (ref 0–0.2)
BASOPHILS NFR BLD AUTO: 0.6 %
EOSINOPHIL # BLD AUTO: 0.2 X10(3)/MCL (ref 0–0.9)
EOSINOPHIL NFR BLD AUTO: 0.6 %
ERYTHROCYTE [DISTWIDTH] IN BLOOD BY AUTOMATED COUNT: 17.2 % (ref 11.5–17)
HCT VFR BLD AUTO: 43 % (ref 42–52)
HGB BLD-MCNC: 13.2 GM/DL (ref 14–18)
IMM GRANULOCYTES # BLD AUTO: 1.52 X10(3)/MCL (ref 0–0.04)
IMM GRANULOCYTES NFR BLD AUTO: 4.9 %
LYMPHOCYTES # BLD AUTO: 1.89 X10(3)/MCL (ref 0.6–4.6)
LYMPHOCYTES NFR BLD AUTO: 6.1 %
MCH RBC QN AUTO: 27.3 PG (ref 27–31)
MCHC RBC AUTO-ENTMCNC: 30.7 MG/DL (ref 33–36)
MCV RBC AUTO: 89 FL (ref 80–94)
MONOCYTES # BLD AUTO: 5.22 X10(3)/MCL (ref 0.1–1.3)
MONOCYTES NFR BLD AUTO: 16.8 %
NEUTROPHILS # BLD AUTO: 22 X10(3)/MCL (ref 2.1–9.2)
NEUTROPHILS NFR BLD AUTO: 71 %
PLATELET # BLD AUTO: 96 X10(3)/MCL (ref 130–400)
PMV BLD AUTO: 10.8 FL (ref 7.4–10.4)
RBC # BLD AUTO: 4.83 X10(6)/MCL (ref 4.7–6.1)
WBC # SPEC AUTO: 31 X10(3)/MCL (ref 4.5–11.5)

## 2022-08-24 PROCEDURE — 3288F FALL RISK ASSESSMENT DOCD: CPT | Mod: CPTII,S$GLB,, | Performed by: NURSE PRACTITIONER

## 2022-08-24 PROCEDURE — 1159F MED LIST DOCD IN RCRD: CPT | Mod: CPTII,S$GLB,, | Performed by: NURSE PRACTITIONER

## 2022-08-24 PROCEDURE — 1126F AMNT PAIN NOTED NONE PRSNT: CPT | Mod: CPTII,S$GLB,, | Performed by: NURSE PRACTITIONER

## 2022-08-24 PROCEDURE — 99999 PR PBB SHADOW E&M-EST. PATIENT-LVL IV: ICD-10-PCS | Mod: PBBFAC,,, | Performed by: NURSE PRACTITIONER

## 2022-08-24 PROCEDURE — 1160F RVW MEDS BY RX/DR IN RCRD: CPT | Mod: CPTII,S$GLB,, | Performed by: NURSE PRACTITIONER

## 2022-08-24 PROCEDURE — 99214 PR OFFICE/OUTPT VISIT, EST, LEVL IV, 30-39 MIN: ICD-10-PCS | Mod: S$GLB,,, | Performed by: NURSE PRACTITIONER

## 2022-08-24 PROCEDURE — 3288F PR FALLS RISK ASSESSMENT DOCUMENTED: ICD-10-PCS | Mod: CPTII,S$GLB,, | Performed by: NURSE PRACTITIONER

## 2022-08-24 PROCEDURE — 1160F PR REVIEW ALL MEDS BY PRESCRIBER/CLIN PHARMACIST DOCUMENTED: ICD-10-PCS | Mod: CPTII,S$GLB,, | Performed by: NURSE PRACTITIONER

## 2022-08-24 PROCEDURE — 1159F PR MEDICATION LIST DOCUMENTED IN MEDICAL RECORD: ICD-10-PCS | Mod: CPTII,S$GLB,, | Performed by: NURSE PRACTITIONER

## 2022-08-24 PROCEDURE — 1101F PT FALLS ASSESS-DOCD LE1/YR: CPT | Mod: CPTII,S$GLB,, | Performed by: NURSE PRACTITIONER

## 2022-08-24 PROCEDURE — 1126F PR PAIN SEVERITY QUANTIFIED, NO PAIN PRESENT: ICD-10-PCS | Mod: CPTII,S$GLB,, | Performed by: NURSE PRACTITIONER

## 2022-08-24 PROCEDURE — 99214 OFFICE O/P EST MOD 30 MIN: CPT | Mod: S$GLB,,, | Performed by: NURSE PRACTITIONER

## 2022-08-24 PROCEDURE — 99999 PR PBB SHADOW E&M-EST. PATIENT-LVL IV: CPT | Mod: PBBFAC,,, | Performed by: NURSE PRACTITIONER

## 2022-08-24 PROCEDURE — 1101F PR PT FALLS ASSESS DOC 0-1 FALLS W/OUT INJ PAST YR: ICD-10-PCS | Mod: CPTII,S$GLB,, | Performed by: NURSE PRACTITIONER

## 2022-10-02 NOTE — PROGRESS NOTES
Subjective:       Patient ID: Francis J Colletta is a 88 y.o. male.    Chief Complaint:  I had a fall at home    Diagnosis:  Myeloproliferative disorder    Current Treatment: Hydroxyurea 500 mg/d (started 5/06/22) -->QOD 5/31/22 -> 6 days on/1 day off beginning 7/22/22    Clinical History:  Patient referred by his PCP for progressive leukocytosis.  Review of previous laboratory showed a WBC count averaging between 15K to 20K dating back to at least 8/19.  Differential showed a mild left shift and no absolute lymphocytosis.  CBC 03/15/22 showed a WBC count of 23.4 with a differential of 70% neutrophils, 14% lymphocytes, 10% monocytes, 2% metas and 2% myelocytes.  Hemoglobin was 14.1, hematocrit 45.7 and platelets 153. Peripheral blood flow cytometry showed no abnormal monoclonal cell populations. Bone marrow aspirate and biopsy 4/19/22 showed a hypercellular marrow with granulocytic hyperplasia and 5.8% myeloblasts.  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  Cytogenetics were normal 46, XY.  FISH for BCR-ABL was negative.  Myeloid molecular panel showed mutations of ASXL1, CBL, [id number], SETBP1, SRSF2 and GATA2.     CT chest 3/21/22 but for follow-up of pulmonary nodules showed stable, small subcentimeter pulmonary nodules and COPD changes.  There is no suspicious lymphadenopathy.    He was seen as a new patient 5/5/22 for a hematology evaluation.  Laboratory testing showed progressive leukocytosis with a white count of 41.4.  There was mild anemia with microcytic, hyperchromic red cell indices, platelet count was normal.  He had no peripheral adenopathy, splenomegaly or B symptoms.  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  He was started on cytoreductive therapy with hydroxyurea 500 mg daily.     WBC improved, but he developed thrombocytopenia with a platelet [place] 45[address].  Hydrea was reduced to 500 mg every other day.  His thrombocytopenia remains  stable, but he had recurrent leukocytosis.  He was changed to Hydrea 500 mg 6 days on 1 day off 7/22/22.      Interval History  He returns to the office today for a 6 week follow-up visit accompanied by his son.  He fell at home in the bathtub 3 weeks ago and had significant ecchymoses on the upper back left shoulder and left flank.  He was evaluated by his PCP.  He has resolving ecchymoses at the sites on his physical exam.  He continues to have easy bruising, particularly on the arms.  No mucocutaneous bleeding.  He denies any fever, chills, night sweats or weight loss.  WBC today shows progression of his leukocytosis at 41.0.  Platelet count has improved to 91,000 one thousand.  He has mild anemia without significant symptoms.    Review of Systems   Constitutional:  Positive for appetite change. Negative for fatigue and fever.   HENT:  Negative for mouth sores, sore throat and trouble swallowing.    Eyes: Negative.    Respiratory:  Negative for cough, chest tightness and shortness of breath.    Cardiovascular:  Negative for chest pain, palpitations and leg swelling.   Gastrointestinal:  Negative for abdominal distention, abdominal pain, blood in stool, change in bowel habit, constipation, diarrhea, nausea, vomiting and change in bowel habit.   Endocrine: Negative.    Genitourinary:  Negative for dysuria, frequency and urgency.   Musculoskeletal:  Positive for arthralgias and back pain.   Integumentary:  Negative for rash and mole/lesion.   Hematological:  Negative for adenopathy. Bruises/bleeds easily.       PMHx:  CAD, Hyperlipidemia, CVA, osteoarthritis, pneumonia, BPH  PSHx:  Tonsils, right hip replacement, AAA repair   SH:  Former heavy pipe smoker, quit 2019. Lives in [place] with his wife, retired .  FH:  His daughter had lung cancer.      Objective:     Physical Exam  Vitals reviewed.   Constitutional:       Comments: Thin white male in no acute distress   HENT:      Head:  Normocephalic and atraumatic.      Nose: Nose normal.      Mouth/Throat:      Mouth: Mucous membranes are moist.      Pharynx: Oropharynx is clear. No posterior oropharyngeal erythema.   Eyes:      Extraocular Movements: Extraocular movements intact.      Conjunctiva/sclera: Conjunctivae normal.      Pupils: Pupils are equal, round, and reactive to light.   Cardiovascular:      Rate and Rhythm: Normal rate and regular rhythm.      Heart sounds: No murmur heard.  Pulmonary:      Breath sounds: Normal breath sounds.   Abdominal:      General: Abdomen is flat. Bowel sounds are normal. There is no distension.      Palpations: Abdomen is soft. There is no splenomegaly or mass.      Tenderness: There is no abdominal tenderness.   Musculoskeletal:         General: No swelling or tenderness. Normal range of motion.      Cervical back: Normal range of motion and neck supple. No tenderness.   Lymphadenopathy:      Comments: No palpable cervical, supraclavicular, axillary or inguinal adenopathy   Skin:     General: Skin is warm and dry.      Findings: Bruising (Scattered ecchymoses on arms) present. No lesion or rash.      Comments: Large resolving ecchymoses upper back, left shoulder and left flank   Neurological:      General: No focal deficit present.      Mental Status: He is alert and oriented to person, place, and time.      Cranial Nerves: No cranial nerve deficit.      Gait: Gait normal.         LABORATORY  No results found for this or any previous visit (from the past 336 hour(s)).    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  RTC in 4 weeks for a follow-up visit and repeat CBC.      XI ALFARO MD    Other Phyisicans  Dr. Sinan Miller

## 2022-10-05 ENCOUNTER — OFFICE VISIT (OUTPATIENT)
Dept: HEMATOLOGY/ONCOLOGY | Facility: CLINIC | Age: 87
End: 2022-10-05
Payer: MEDICARE

## 2022-10-05 VITALS
HEIGHT: 69 IN | RESPIRATION RATE: 17 BRPM | TEMPERATURE: 97 F | WEIGHT: 133.69 LBS | SYSTOLIC BLOOD PRESSURE: 112 MMHG | OXYGEN SATURATION: 97 % | DIASTOLIC BLOOD PRESSURE: 77 MMHG | HEART RATE: 78 BPM | BODY MASS INDEX: 19.8 KG/M2

## 2022-10-05 DIAGNOSIS — D47.1 MYELOPROLIFERATIVE DISORDER: Primary | ICD-10-CM

## 2022-10-05 PROCEDURE — 3288F FALL RISK ASSESSMENT DOCD: CPT | Mod: CPTII,S$GLB,, | Performed by: INTERNAL MEDICINE

## 2022-10-05 PROCEDURE — 99999 PR PBB SHADOW E&M-EST. PATIENT-LVL IV: CPT | Mod: PBBFAC,,, | Performed by: INTERNAL MEDICINE

## 2022-10-05 PROCEDURE — 1159F PR MEDICATION LIST DOCUMENTED IN MEDICAL RECORD: ICD-10-PCS | Mod: CPTII,S$GLB,, | Performed by: INTERNAL MEDICINE

## 2022-10-05 PROCEDURE — 1160F RVW MEDS BY RX/DR IN RCRD: CPT | Mod: CPTII,S$GLB,, | Performed by: INTERNAL MEDICINE

## 2022-10-05 PROCEDURE — 99999 PR PBB SHADOW E&M-EST. PATIENT-LVL IV: ICD-10-PCS | Mod: PBBFAC,,, | Performed by: INTERNAL MEDICINE

## 2022-10-05 PROCEDURE — 99213 PR OFFICE/OUTPT VISIT, EST, LEVL III, 20-29 MIN: ICD-10-PCS | Mod: S$GLB,,, | Performed by: INTERNAL MEDICINE

## 2022-10-05 PROCEDURE — 1101F PR PT FALLS ASSESS DOC 0-1 FALLS W/OUT INJ PAST YR: ICD-10-PCS | Mod: CPTII,S$GLB,, | Performed by: INTERNAL MEDICINE

## 2022-10-05 PROCEDURE — 3288F PR FALLS RISK ASSESSMENT DOCUMENTED: ICD-10-PCS | Mod: CPTII,S$GLB,, | Performed by: INTERNAL MEDICINE

## 2022-10-05 PROCEDURE — 1126F AMNT PAIN NOTED NONE PRSNT: CPT | Mod: CPTII,S$GLB,, | Performed by: INTERNAL MEDICINE

## 2022-10-05 PROCEDURE — 1101F PT FALLS ASSESS-DOCD LE1/YR: CPT | Mod: CPTII,S$GLB,, | Performed by: INTERNAL MEDICINE

## 2022-10-05 PROCEDURE — 1126F PR PAIN SEVERITY QUANTIFIED, NO PAIN PRESENT: ICD-10-PCS | Mod: CPTII,S$GLB,, | Performed by: INTERNAL MEDICINE

## 2022-10-05 PROCEDURE — 99213 OFFICE O/P EST LOW 20 MIN: CPT | Mod: S$GLB,,, | Performed by: INTERNAL MEDICINE

## 2022-10-05 PROCEDURE — 1159F MED LIST DOCD IN RCRD: CPT | Mod: CPTII,S$GLB,, | Performed by: INTERNAL MEDICINE

## 2022-10-05 PROCEDURE — 1160F PR REVIEW ALL MEDS BY PRESCRIBER/CLIN PHARMACIST DOCUMENTED: ICD-10-PCS | Mod: CPTII,S$GLB,, | Performed by: INTERNAL MEDICINE

## 2022-10-19 ENCOUNTER — OFFICE VISIT (OUTPATIENT)
Dept: PREADMISSION TESTING | Facility: HOSPITAL | Age: 87
End: 2022-10-19
Attending: UROLOGY
Payer: MEDICARE

## 2022-10-19 DIAGNOSIS — Z01.818 OTHER SPECIFIED PRE-OPERATIVE EXAMINATION: ICD-10-CM

## 2022-10-19 DIAGNOSIS — Z01.818 OTHER SPECIFIED PRE-OPERATIVE EXAMINATION: Primary | ICD-10-CM

## 2022-10-19 DIAGNOSIS — N52.01 ERECTILE DYSFUNCTION DUE TO ARTERIAL INSUFFICIENCY: Primary | ICD-10-CM

## 2022-10-19 LAB
ALBUMIN SERPL-MCNC: 4 GM/DL (ref 3.4–4.8)
ALBUMIN/GLOB SERPL: 1.7 RATIO (ref 1.1–2)
ALP SERPL-CCNC: 273 UNIT/L (ref 40–150)
ALT SERPL-CCNC: 7 UNIT/L (ref 0–55)
AST SERPL-CCNC: 18 UNIT/L (ref 5–34)
BILIRUBIN DIRECT+TOT PNL SERPL-MCNC: 0.9 MG/DL
BUN SERPL-MCNC: 32.5 MG/DL (ref 8.4–25.7)
CALCIUM SERPL-MCNC: 9.1 MG/DL (ref 8.8–10)
CHLORIDE SERPL-SCNC: 103 MMOL/L (ref 98–107)
CO2 SERPL-SCNC: 25 MMOL/L (ref 23–31)
CREAT SERPL-MCNC: 1.9 MG/DL (ref 0.73–1.18)
GFR SERPLBLD CREATININE-BSD FMLA CKD-EPI: 34 MLS/MIN/1.73/M2
GLOBULIN SER-MCNC: 2.4 GM/DL (ref 2.4–3.5)
GLUCOSE SERPL-MCNC: 82 MG/DL (ref 82–115)
POTASSIUM SERPL-SCNC: 4.9 MMOL/L (ref 3.5–5.1)
PROT SERPL-MCNC: 6.4 GM/DL (ref 5.8–7.6)
SODIUM SERPL-SCNC: 138 MMOL/L (ref 136–145)

## 2022-10-19 PROCEDURE — 93010 EKG 12-LEAD: ICD-10-PCS | Mod: ,,, | Performed by: INTERNAL MEDICINE

## 2022-10-19 PROCEDURE — 36415 COLL VENOUS BLD VENIPUNCTURE: CPT

## 2022-10-19 PROCEDURE — 80053 COMPREHEN METABOLIC PANEL: CPT

## 2022-10-19 PROCEDURE — 93010 ELECTROCARDIOGRAM REPORT: CPT | Mod: ,,, | Performed by: INTERNAL MEDICINE

## 2022-10-19 PROCEDURE — 93005 ELECTROCARDIOGRAM TRACING: CPT

## 2022-10-19 RX ORDER — POLYETHYLENE GLYCOL 3350 17 G/17G
17 POWDER, FOR SOLUTION ORAL EVERY OTHER DAY
COMMUNITY

## 2022-10-21 ENCOUNTER — ANESTHESIA EVENT (OUTPATIENT)
Dept: SURGERY | Facility: HOSPITAL | Age: 87
End: 2022-10-21
Payer: MEDICARE

## 2022-10-21 ENCOUNTER — ANESTHESIA (OUTPATIENT)
Dept: SURGERY | Facility: HOSPITAL | Age: 87
End: 2022-10-21
Payer: MEDICARE

## 2022-10-21 ENCOUNTER — HOSPITAL ENCOUNTER (OUTPATIENT)
Facility: HOSPITAL | Age: 87
Discharge: HOME | End: 2022-10-21
Attending: UROLOGY | Admitting: UROLOGY
Payer: MEDICARE

## 2022-10-21 LAB
CTP QC/QA: YES
SARS-COV-2 AG RESP QL IA.RAPID: NEGATIVE

## 2022-10-21 PROCEDURE — 63600175 PHARM REV CODE 636 W HCPCS: Performed by: NURSE ANESTHETIST, CERTIFIED REGISTERED

## 2022-10-21 PROCEDURE — 36000707: Performed by: UROLOGY

## 2022-10-21 PROCEDURE — 71000015 HC POSTOP RECOV 1ST HR: Performed by: UROLOGY

## 2022-10-21 PROCEDURE — 25000003 PHARM REV CODE 250: Performed by: NURSE ANESTHETIST, CERTIFIED REGISTERED

## 2022-10-21 PROCEDURE — 36000706: Performed by: UROLOGY

## 2022-10-21 PROCEDURE — 37000009 HC ANESTHESIA EA ADD 15 MINS: Performed by: UROLOGY

## 2022-10-21 PROCEDURE — 71000033 HC RECOVERY, INTIAL HOUR: Performed by: UROLOGY

## 2022-10-21 PROCEDURE — 71000016 HC POSTOP RECOV ADDL HR: Performed by: UROLOGY

## 2022-10-21 PROCEDURE — 63600175 PHARM REV CODE 636 W HCPCS: Performed by: ANESTHESIOLOGY

## 2022-10-21 PROCEDURE — 37000008 HC ANESTHESIA 1ST 15 MINUTES: Performed by: UROLOGY

## 2022-10-21 RX ORDER — LIDOCAINE HYDROCHLORIDE 10 MG/ML
1 INJECTION, SOLUTION EPIDURAL; INFILTRATION; INTRACAUDAL; PERINEURAL ONCE
Status: CANCELLED | OUTPATIENT
Start: 2022-10-21 | End: 2022-10-21

## 2022-10-21 RX ORDER — FENTANYL CITRATE 50 UG/ML
INJECTION, SOLUTION INTRAMUSCULAR; INTRAVENOUS
Status: DISCONTINUED | OUTPATIENT
Start: 2022-10-21 | End: 2022-10-21

## 2022-10-21 RX ORDER — CEFAZOLIN SODIUM 1 G/3ML
1 INJECTION, POWDER, FOR SOLUTION INTRAMUSCULAR; INTRAVENOUS
Status: DISCONTINUED | OUTPATIENT
Start: 2022-10-21 | End: 2022-10-21 | Stop reason: HOSPADM

## 2022-10-21 RX ORDER — ONDANSETRON HYDROCHLORIDE 2 MG/ML
INJECTION, SOLUTION INTRAMUSCULAR; INTRAVENOUS
Status: DISCONTINUED | OUTPATIENT
Start: 2022-10-21 | End: 2022-10-21

## 2022-10-21 RX ORDER — LIDOCAINE HYDROCHLORIDE 10 MG/ML
INJECTION, SOLUTION EPIDURAL; INFILTRATION; INTRACAUDAL; PERINEURAL
Status: DISCONTINUED | OUTPATIENT
Start: 2022-10-21 | End: 2022-10-21

## 2022-10-21 RX ORDER — PROPOFOL 10 MG/ML
VIAL (ML) INTRAVENOUS
Status: DISCONTINUED | OUTPATIENT
Start: 2022-10-21 | End: 2022-10-21

## 2022-10-21 RX ORDER — HYDROMORPHONE HYDROCHLORIDE 2 MG/ML
0.2 INJECTION, SOLUTION INTRAMUSCULAR; INTRAVENOUS; SUBCUTANEOUS EVERY 5 MIN PRN
Status: DISCONTINUED | OUTPATIENT
Start: 2022-10-21 | End: 2022-10-21 | Stop reason: HOSPADM

## 2022-10-21 RX ORDER — SODIUM CHLORIDE, SODIUM LACTATE, POTASSIUM CHLORIDE, CALCIUM CHLORIDE 600; 310; 30; 20 MG/100ML; MG/100ML; MG/100ML; MG/100ML
INJECTION, SOLUTION INTRAVENOUS CONTINUOUS
Status: DISCONTINUED | OUTPATIENT
Start: 2022-10-21 | End: 2022-10-21 | Stop reason: HOSPADM

## 2022-10-21 RX ORDER — ONDANSETRON 2 MG/ML
4 INJECTION INTRAMUSCULAR; INTRAVENOUS ONCE AS NEEDED
Status: DISCONTINUED | OUTPATIENT
Start: 2022-10-21 | End: 2022-10-21 | Stop reason: HOSPADM

## 2022-10-21 RX ADMIN — LIDOCAINE HYDROCHLORIDE 20 MG: 10 INJECTION, SOLUTION EPIDURAL; INFILTRATION; INTRACAUDAL; PERINEURAL at 01:10

## 2022-10-21 RX ADMIN — SODIUM CHLORIDE, POTASSIUM CHLORIDE, SODIUM LACTATE AND CALCIUM CHLORIDE: 600; 310; 30; 20 INJECTION, SOLUTION INTRAVENOUS at 02:10

## 2022-10-21 RX ADMIN — ONDANSETRON 4 MG: 2 INJECTION INTRAMUSCULAR; INTRAVENOUS at 02:10

## 2022-10-21 RX ADMIN — DEXTROSE MONOHYDRATE 1 G: 50 INJECTION, SOLUTION INTRAVENOUS at 01:10

## 2022-10-21 RX ADMIN — SODIUM CHLORIDE, POTASSIUM CHLORIDE, SODIUM LACTATE AND CALCIUM CHLORIDE: 600; 310; 30; 20 INJECTION, SOLUTION INTRAVENOUS at 01:10

## 2022-10-21 RX ADMIN — PROPOFOL 70 MG: 10 INJECTION, EMULSION INTRAVENOUS at 01:10

## 2022-10-21 RX ADMIN — FENTANYL CITRATE 50 MCG: 50 INJECTION, SOLUTION INTRAMUSCULAR; INTRAVENOUS at 01:10

## 2022-10-21 NOTE — PLAN OF CARE
Pt is [id number] score10. He meets criteria for phase2 care per jackie-to  via bed with willis

## 2022-10-21 NOTE — DISCHARGE INSTRUCTIONS
Patient Education        Penile Prosthesis Insertion Discharge Instructions     What care is needed at home?   Make sure not to soak the wound or submerge it under water. Do not swim or soak in a bath or hot tub until your doctor says you may. Gently towel-dry the wound afterwards.  Wear supportive underwear while you heal.  Place an ice pack wrapped in a towel over the painful part to lessen pain and swelling. Never put ice right on the skin. Do not leave the ice on more than 10 to 15 minutes at a time.  Outer dressing can come off. Penis must remain in upright position.   You may take shower in 48 hours. Wash hands before and after touching dressing and incision. Wash incision with soap and water. Pat dry. Leave open to air. Do not pick off dressing or dermabond (skin glue). It will fall off on its own.   Do not lift anything over 10 pounds (4.5 kg).  Ask the doctor when you may go back to your normal activities like work, driving, or sex.    What follow-up care is needed?   Be sure to keep your visits. You will not be able to use the device until your doctor says it is okay. Most often, this is about 6 weeks after surgery.    What lifestyle changes are needed?   Avoid tiring activities or sports for 2 to 3 weeks after the procedure.  Do not start very physical activity for about 6 weeks after the surgery. Ask your doctor about when you can return to working out.  You will need to wait for a while to have sex after your surgery. Ask your doctor when you can have sex.  Avoid smoking and drinking beer, wine, and mixed drinks (alcohol).  Your doctor will talk about ways of hiding the semirigid device in clothing.  Your doctor will talk with you and your partner on how to pump up and deflate your inflatable penis.    What problems could happen?   Bleeding  Infection  Device fails  Damage to the urethra or the bladder or the bowels  Bruising in the penis and scrotum  Device moves and comes out of the skin or  urethra    When do I need to call the doctor?   Signs of wound infection such as a fever of 100.4°F (38°C) or higher, chills, swelling, redness, warmth around the wound; too much pain when touched; yellowish, greenish, or bloody discharge; foul smell coming from the cut site; cut site opens up.   Problems passing urine  Very bad pain or bleeding at the cut site

## 2022-10-21 NOTE — OP NOTE
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  HE WAS GIVEN A GENERAL ANESTHETIC IN SUPINE POSITION HIS ABDOMEN GENITALS PERINEUM AND LOWER EXTREMITIES ABOVE THE KNEES WERE ALL PREPPED AND DRAPED IN THE USUAL STERILE FASHION.  THE PATIENT'S LEFT CYLINDER WAS ERODING JUST ABOVE THE URETHRA IN THE GLANS.  I PLACED A 2-0 VICRYL STITCH THROUGH THE URETHRAL MEATUS AND GLANS TO PLACE  TRACTION ON THE PENIS.  USING A 15. BLADE I MADE A HORIZONTAL PENOSCROTAL INCISION AND DIVIDED THE SCROTAL LAYERS DOWN TO THE PSEUDOCAPSULE SPACE OF THE PUMP A FOLLOWED THE TUBING TO THE CYLINDERS AT THE LEVEL OF THE CORPORAL BODIES.  USING THE CUTTING CURRENT OF THE CAUTERY TO MAKE CORPOROTOMIES ON BOTH SIDES ABOVE THE INSERTION OF THE TUBING IN USING A VEIN RETRACTOR IS A PULLED OUT THE CYLINDERS INCLUDING THE REAR TIP EXTENDERS.  I USED A SUCTION DEVICE TO EMPTY THE BLADDER OUT THE GABRIEL CATHETER AND A FOLLOWED THE TUBING BACK TO THE PSEUDOCAPSULE SPACE OF THE RESERVOIR IN THE LEFT RETROPUBIC LOCATION.  THE RESERVOIR WAS REMOVED AND I WASHED ALL THE PSEUDOCAPSULE SPACES OUT WITH IRRISEPT.  FOLLOWING THIS, I CLOSED THE CORPOROTOMIES WITH FIGURE-EIGHT 2-0 VICRYL STITCHES AND I CLOSED THE SCROTUM WITH THE INTERRUPTED AND 2-0 VICRYL VERTICAL MATTRESS SUTURES.  I CLOSED THE HOLE IN THE GLANS PENIS WITH RUNNING 2-0 VICRYL  STITCH.  I LEFT GABRIEL CATHETER IN URETHRA.  HE TOLERATED PROCEDURE WELL.  THERE WERE NO COMPLICATIONS.  HE WAS EXTUBATED BROUGHT TO RECOVERY IN GOOD CONDITION

## 2022-10-21 NOTE — TRANSFER OF CARE
Anesthesia Transfer of Care Note    Patient: Francis J Colletta    Procedure(s) Performed: Procedure(s) (LRB):  REMOVAL, PENILE IMPLANT (N/A)    Patient location: PACU    Anesthesia Type: general    Transport from OR: Transported from OR on room air with adequate spontaneous ventilation    Post pain: adequate analgesia    Post assessment: no apparent anesthetic complications and tolerated procedure well    Post vital signs: stable    Level of consciousness: sedated    Nausea/Vomiting: no nausea/vomiting    Complications: none    Transfer of care protocol was followed

## 2022-10-21 NOTE — ANESTHESIA PROCEDURE NOTES
Intubation    Date/Time: 10/21/2022 1:09 PM  Performed by: Corine Plummer CRNA  Authorized by: Kian Munguia MD     Intubation:     Induction:  Intravenous    Intubated:  Postinduction    Mask Ventilation:  Easy mask    Attempts:  1    Attempted By:  CRNA    Difficult Airway Encountered?: No      Airway Device:  Supraglottic airway/LMA    Airway Device Size:  5.0    Style/Cuff Inflation:  Cuffed (inflated to minimal occlusive pressure)    Inflation Amount (mL):  18    Placement Verified By:  Capnometry    Complicating Factors:  None    Findings Post-Intubation:  BS equal bilateral

## 2022-10-21 NOTE — ANESTHESIA PREPROCEDURE EVALUATION
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    I have reviewed the NPO Status.   I have reviewed the Medications.     Review of Systems  Anesthesia Hx:  No problems with previous Anesthesia    Social:  Former Smoker    Hematology/Oncology:         -- Anemia: Hematology Comments: Myloproliferative D/O , w/ anemia, leukocytosis, thrombocytopenia   Cardiovascular:   Hypertension CAD   hyperlipidemia Endovascular AAA Repair (2021)   Pulmonary:  Pulmonary Normal    Renal/:   BPH    Hepatic/GI:  Hepatic/GI Normal    Musculoskeletal:   Arthritis     Neurological:   CVA    Endocrine:  Endocrine Normal        Physical Exam  General: Alert and Oriented    Airway:  Mallampati: II   Mouth Opening: Normal  TM Distance: Normal  Tongue: Normal  Neck ROM: Normal ROM    Dental:  Dentures    Chest/Lungs:  Normal Respiratory Rate    Heart:  Rate: Normal  Rhythm: Regular Rhythm    EKG (10/19/22) :  NSR    ECHO (2018) :  CONCLUSIONS     1 - Normal left ventricular systolic function (EF 60-65%).     2 - Impaired LV relaxation, normal LAP (grade 1 diastolic dysfunction).     3 - Normal right ventricular systolic function .     4 - The estimated PA systolic pressure is 21 mmHg.      Latest Reference Range & Units 10/05/22 09:09   WBC 4.5 - 11.5 x10(3)/mcL 41.0 (H)   RBC 4.70 - 6.10 x10(6)/mcL 3.95 (L)   Hemoglobin 14.0 - 18.0 gm/dL 11.2 (L)    Hematocrit 42.0 - 52.0 % 37.1 (L)   MCV 80.0 - 94.0 fL 93.9   MCH 27.0 - 31.0 pg 28.4   MCHC 33.0 - 36.0 mg/dL 30.2 (L)   RDW 11.5 - 17.0 % 19.1 (H)   Platelets 130 - 400 x10(3)/mcL 91 (L)   (H): Data is abnormally high  (L): Data is abnormally low   Latest Reference Range & Units 10/19/22 15:30   Sodium 136 - 145 mmol/L 138   Potassium 3.5 - 5.1 mmol/L 4.9   Chloride 98 - 107 mmol/L 103   CO2 23 - 31 mmol/L 25   BUN 8.4 - 25.7 mg/dL 32.5 (H)   Creatinine 0.73 - 1.18 mg/dL 1.90 (H)   eGFR mls/min/1.73/m2 34   Glucose 82 - 115 mg/dL 82   Calcium 8.8 - 10.0 mg/dL 9.1   Alkaline Phosphatase 40 - 150 unit/L 273 (H)   PROTEIN TOTAL 5.8 - 7.6 gm/dL 6.4   Albumin 3.4 - 4.8 gm/dL 4.0   Albumin/Globulin Ratio 1.1 - 2.0 ratio 1.7   BILIRUBIN TOTAL <=1.5 mg/dL 0.9   AST 5 - 34 unit/L 18   ALT 0 - 55 unit/L 7   (H): Data is abnormally high        Anesthesia Plan  Type of Anesthesia, risks & benefits discussed:    Anesthesia Type: Gen Supraglottic Airway  Intra-op Monitoring Plan: Standard ASA Monitors  Post Op Pain Control Plan: IV/PO Opioids PRN and multimodal analgesia  Induction:  IV  Airway Plan: Direct  Informed Consent: Informed consent signed with the Patient and all parties understand the risks and agree with anesthesia plan.  All questions answered. Patient consented to blood products? Yes  ASA Score: 3  Day of Surgery Review of History & Physical: H&P Update referred to the surgeon/provider.H&P completed by Anesthesiologist.    Ready For Surgery From Anesthesia Perspective.     .

## 2022-10-22 VITALS
BODY MASS INDEX: 19.85 KG/M2 | HEART RATE: 66 BPM | TEMPERATURE: 98 F | OXYGEN SATURATION: 98 % | SYSTOLIC BLOOD PRESSURE: 127 MMHG | HEIGHT: 69 IN | DIASTOLIC BLOOD PRESSURE: 73 MMHG | RESPIRATION RATE: 15 BRPM | WEIGHT: 134 LBS

## 2022-10-22 NOTE — PLAN OF CARE
Spoke with with Anita  (patient's wife) regarding post op phone call this morning c/o bleeding and swelling; wife spoke with on call physician who stated bleeding and swelling was normal as long as bleeding is not profuse; wife stated bleeding is not profuse

## 2022-10-24 NOTE — ANESTHESIA POSTPROCEDURE EVALUATION
Anesthesia Post Evaluation    Patient: Francis J Colletta    Procedure(s) Performed: Procedure(s) (LRB):  REMOVAL, PENILE IMPLANT (N/A)    Final Anesthesia Type: general      Patient location during evaluation: OPS  Patient participation: Yes- Able to Participate  Level of consciousness: awake and oriented  Post-procedure vital signs: reviewed and stable  Pain management: adequate  Airway patency: patent    PONV status at discharge: No PONV  Anesthetic complications: no      Cardiovascular status: stable and hemodynamically stable  Respiratory status: unassisted and spontaneous ventilation  Hydration status: euvolemic  Follow-up not needed.          Vitals Value Taken Time   /73 10/21/22 1450   Temp 36.5 °C (97.7 °F) 10/21/22 1450   Pulse 65 10/21/22 1452   Resp 18 10/21/22 1452   SpO2 98 % 10/21/22 1452   Vitals shown include unvalidated device data.      Event Time   Out of Recovery 14:55:00         Pain/Amisha Score: No data recorded

## 2022-11-02 ENCOUNTER — TELEPHONE (OUTPATIENT)
Dept: HEMATOLOGY/ONCOLOGY | Facility: CLINIC | Age: 87
End: 2022-11-02
Payer: MEDICARE

## 2022-11-04 ENCOUNTER — OFFICE VISIT (OUTPATIENT)
Dept: HEMATOLOGY/ONCOLOGY | Facility: CLINIC | Age: 87
End: 2022-11-04
Attending: INTERNAL MEDICINE
Payer: MEDICARE

## 2022-11-04 VITALS
OXYGEN SATURATION: 97 % | TEMPERATURE: 99 F | HEART RATE: 65 BPM | HEIGHT: 69 IN | RESPIRATION RATE: 18 BRPM | BODY MASS INDEX: 20.29 KG/M2 | WEIGHT: 137 LBS | DIASTOLIC BLOOD PRESSURE: 73 MMHG | SYSTOLIC BLOOD PRESSURE: 101 MMHG

## 2022-11-04 DIAGNOSIS — D47.1 MYELOPROLIFERATIVE DISORDER: Primary | ICD-10-CM

## 2022-11-04 PROCEDURE — 1101F PT FALLS ASSESS-DOCD LE1/YR: CPT | Mod: CPTII,S$GLB,, | Performed by: NURSE PRACTITIONER

## 2022-11-04 PROCEDURE — 99214 PR OFFICE/OUTPT VISIT, EST, LEVL IV, 30-39 MIN: ICD-10-PCS | Mod: S$GLB,,, | Performed by: NURSE PRACTITIONER

## 2022-11-04 PROCEDURE — 1160F RVW MEDS BY RX/DR IN RCRD: CPT | Mod: CPTII,S$GLB,, | Performed by: NURSE PRACTITIONER

## 2022-11-04 PROCEDURE — 3288F PR FALLS RISK ASSESSMENT DOCUMENTED: ICD-10-PCS | Mod: CPTII,S$GLB,, | Performed by: NURSE PRACTITIONER

## 2022-11-04 PROCEDURE — 1126F PR PAIN SEVERITY QUANTIFIED, NO PAIN PRESENT: ICD-10-PCS | Mod: CPTII,S$GLB,, | Performed by: NURSE PRACTITIONER

## 2022-11-04 PROCEDURE — 3288F FALL RISK ASSESSMENT DOCD: CPT | Mod: CPTII,S$GLB,, | Performed by: NURSE PRACTITIONER

## 2022-11-04 PROCEDURE — 1159F MED LIST DOCD IN RCRD: CPT | Mod: CPTII,S$GLB,, | Performed by: NURSE PRACTITIONER

## 2022-11-04 PROCEDURE — 1160F PR REVIEW ALL MEDS BY PRESCRIBER/CLIN PHARMACIST DOCUMENTED: ICD-10-PCS | Mod: CPTII,S$GLB,, | Performed by: NURSE PRACTITIONER

## 2022-11-04 PROCEDURE — 99999 PR PBB SHADOW E&M-EST. PATIENT-LVL IV: CPT | Mod: PBBFAC,,, | Performed by: NURSE PRACTITIONER

## 2022-11-04 PROCEDURE — 99999 PR PBB SHADOW E&M-EST. PATIENT-LVL IV: ICD-10-PCS | Mod: PBBFAC,,, | Performed by: NURSE PRACTITIONER

## 2022-11-04 PROCEDURE — 1159F PR MEDICATION LIST DOCUMENTED IN MEDICAL RECORD: ICD-10-PCS | Mod: CPTII,S$GLB,, | Performed by: NURSE PRACTITIONER

## 2022-11-04 PROCEDURE — 1101F PR PT FALLS ASSESS DOC 0-1 FALLS W/OUT INJ PAST YR: ICD-10-PCS | Mod: CPTII,S$GLB,, | Performed by: NURSE PRACTITIONER

## 2022-11-04 PROCEDURE — 1126F AMNT PAIN NOTED NONE PRSNT: CPT | Mod: CPTII,S$GLB,, | Performed by: NURSE PRACTITIONER

## 2022-11-04 PROCEDURE — 99214 OFFICE O/P EST MOD 30 MIN: CPT | Mod: S$GLB,,, | Performed by: NURSE PRACTITIONER

## 2022-11-04 NOTE — PROGRESS NOTES
Subjective:       Patient ID: Francis J Colletta is a 89 y.o. male.    Chief Complaint:  I've had a lot of procedures    Diagnosis:  Myeloproliferative disorder    Current Treatment: Hydroxyurea 500 mg/d (started 5/06/22) -->QOD 5/31/22 -> 6 days on/1 day 7/22/22 -->QD 10/5/22    Clinical History:  Patient referred by his PCP for progressive leukocytosis.  Review of previous laboratory showed a WBC count averaging between 15K to 20K dating back to at least 8/19.  Differential showed a mild left shift and no absolute lymphocytosis.  CBC 03/15/22 showed a WBC count of 23.4 with a differential of 70% neutrophils, 14% lymphocytes, 10% monocytes, 2% metas and 2% myelocytes.  Hemoglobin was 14.1, hematocrit 45.7 and platelets 153. Peripheral blood flow cytometry showed no abnormal monoclonal cell populations. Bone marrow aspirate and biopsy 4/19/22 showed a hypercellular marrow with granulocytic hyperplasia and 5.8% myeloblasts.  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  Cytogenetics were normal 46, XY.  FISH for BCR-ABL was negative.  Myeloid molecular panel showed mutations of ASXL1, CBL, [id number], SETBP1, SRSF2 and GATA2.     CT chest 3/21/22 but for follow-up of pulmonary nodules showed stable, small subcentimeter pulmonary nodules and COPD changes.  There is no suspicious lymphadenopathy.    He was seen as a new patient 5/5/22 for a hematology evaluation.  Laboratory testing showed progressive leukocytosis with a white count of 41.4.  There was mild anemia with microcytic, hyperchromic red cell indices, platelet count was normal.  He had no peripheral adenopathy, splenomegaly or B symptoms.  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  He was started on cytoreductive therapy with hydroxyurea 500 mg daily.     WBC improved, but he developed thrombocytopenia with a platelet count of 80958.  Hydrea was reduced to 500 mg every other day.  His thrombocytopenia  remains stable, but he had recurrent leukocytosis.  He was changed to Hydrea 500 mg 6 days on 1 day off 7/22/22, and back to once a day on 10/5/22.      Interval History  He returns to the office today for a 4 week follow-up visit accompanied by his son.  He is ambulatory with a cane.  He is taking his Hydrea once daily as directed.  He underwent removal of penile implant on 10/21/22.  He has been experiencing mild bleeding which is gradually decreasing.  He was seen in follow-up by Urology earlier this week and reassurance was provided.  Since his last visit, he also received an injection (NSAID versus steroid) for bursitis of the right hip,  and most recently dental implants.  He is having some mild oral bleeding as well.  He did receive prophylactic antibiotics for the dental procedure.  He denies any fever or chills, sweats, or itching.  His appetite has not been the best, but he is slowly gaining weight.  Laboratory in the office today reveals progressive leukocytosis with a WBC of 78.8, hemoglobin of 8.2, and platelet count of 143,000.  Patient has no associated symptoms.  Results were discussed today with patient and his son.    Review of Systems   Constitutional:  Positive for appetite change. Negative for fatigue and fever.        + Weight gain   HENT:  Positive for dental problem. Negative for mouth sores, sore throat and trouble swallowing.    Eyes: Negative.    Respiratory:  Negative for cough, chest tightness and shortness of breath.    Cardiovascular:  Negative for chest pain, palpitations and leg swelling.   Gastrointestinal:  Negative for abdominal distention, abdominal pain, blood in stool, change in bowel habit, constipation, diarrhea, nausea, vomiting and change in bowel habit.   Endocrine: Negative.    Genitourinary:  Positive for penile pain (with bleeding s/t recent surgery). Negative for dysuria, frequency and urgency.   Musculoskeletal:  Positive for arthralgias and back pain.    Integumentary:  Negative for rash and mole/lesion.   Neurological:  Negative for weakness.   Hematological:  Negative for adenopathy. Bruises/bleeds easily.       PMHx:  CAD, Hyperlipidemia, CVA, osteoarthritis, pneumonia, BPH  PSHx:  Tonsils, right hip replacement, AAA repair   SH:  Former heavy pipe smoker, quit 2019. Lives in [place] with his wife, retired .  FH:  His daughter had lung cancer.      Objective:     Physical Exam  Vitals reviewed.   Constitutional:       Comments: Thin white male in no acute distress   HENT:      Head: Normocephalic and atraumatic.      Nose: Nose normal.      Mouth/Throat:      Mouth: Mucous membranes are moist.      Pharynx: Oropharynx is clear. No posterior oropharyngeal erythema.   Eyes:      Extraocular Movements: Extraocular movements intact.      Conjunctiva/sclera: Conjunctivae normal.      Pupils: Pupils are equal, round, and reactive to light.   Cardiovascular:      Rate and Rhythm: Normal rate and regular rhythm.      Heart sounds: No murmur heard.  Pulmonary:      Breath sounds: Normal breath sounds.   Abdominal:      General: Abdomen is flat. Bowel sounds are normal. There is no distension.      Palpations: Abdomen is soft. There is no splenomegaly or mass.      Tenderness: There is no abdominal tenderness.   Musculoskeletal:         General: No swelling or tenderness. Normal range of motion.      Cervical back: Normal range of motion and neck supple. No tenderness.   Lymphadenopathy:      Comments: No palpable cervical, supraclavicular, axillary or inguinal adenopathy   Skin:     General: Skin is warm and dry.      Findings: Bruising (Scattered ecchymoses on arms) present. No lesion or rash.   Neurological:      General: No focal deficit present.      Mental Status: He is alert and oriented to person, place, and time.      Cranial Nerves: No cranial nerve deficit.      Gait: Gait normal.         LABORATORY  Recent Results (from the past 336  hour(s))   CBC with Differential    Collection Time: 11/04/22  9:18 AM   Result Value Ref Range    WBC 78.8 (HH) 4.5 - 11.5 x10(3)/mcL    RBC 2.73 (L) 4.70 - 6.10 x10(6)/mcL    Hgb 8.2 (L) 14.0 - 18.0 gm/dL    Hct 26.6 (L) 42.0 - 52.0 %    MCV 97.4 (H) 80.0 - 94.0 fL    MCH 30.0 27.0 - 31.0 pg    MCHC 30.8 (L) 33.0 - 36.0 mg/dL    RDW 19.3 (H) 11.5 - 17.0 %    Platelet 143 130 - 400 x10(3)/mcL    MPV 11.0 (H) 7.4 - 10.4 fL    Neut % 77.6 %    Lymph % 2.1 %    Mono % 14.0 %    Eos % 0.0 %    Basophil % 0.1 %    Lymph # 1.66 0.6 - 4.6 x10(3)/mcL    Neut # 61.2 (H) 2.1 - 9.2 x10(3)/mcL    Mono # 11.04 (H) 0.1 - 1.3 x10(3)/mcL    Eos # 0.02 0 - 0.9 x10(3)/mcL    Baso # 0.04 0 - 0.2 x10(3)/mcL    IG# 4.85 (H) 0 - 0.04 x10(3)/mcL    IG% 6.2 %   Manual Differential    Collection Time: 11/04/22  9:18 AM   Result Value Ref Range    Neut Man 77 47 - 80 %    Lymph Man 5 (L) 13 - 40 %    Monocyte Man 6 2 - 11 %    Dixon Man 9 %    Myelo Man 3 %    Abs Neut calc 70.132 (H) 2.1 - 9.2 x10(3)/mcL    Abs Mono 4.728 (H) 0.1 - 1.3 x10(3)/mcL    Abs Lymp 3.94 0.6 - 4.6 x10(3)/mcL    Toxic Gran Slight (A) (none)           Assessment:   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 Gorman.  Continue Hydrea 500 mg daily.  Peripheral smear for physician to review.  Short term follow-up in 2 weeks with same day CBC.  Patient and son in agreement.    All questions answered.    KIRA STREETER, FNP-C    Other Phyisicans  Dr. Sinan Miller

## 2022-11-18 NOTE — PROGRESS NOTES
Subjective:       Patient ID: Francis J Colletta is a 89 y.o. male.    Chief Complaint:  Weight loss    Diagnosis:  Myeloproliferative disorder    Current Treatment: Hydroxyurea 500 mg/d (started 5/06/22) -->QOD 5/31/22 -> 6 days on/1 day 7/22/22 -->QD 10/5/22    Clinical History:  Patient referred by his PCP for progressive leukocytosis.  Review of previous laboratory showed a WBC count averaging between 15K to 20K dating back to at least 8/19.  Differential showed a mild left shift and no absolute lymphocytosis.  CBC 03/15/22 showed a WBC count of 23.4 with a differential of 70% neutrophils, 14% lymphocytes, 10% monocytes, 2% metas and 2% myelocytes.  Hemoglobin was 14.1, hematocrit 45.7 and platelets 153. Peripheral blood flow cytometry showed no abnormal monoclonal cell populations. Bone marrow aspirate and biopsy 4/19/22 showed a hypercellular marrow with granulocytic hyperplasia and 5.8% myeloblasts.  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  Cytogenetics were normal 46, XY.  FISH for BCR-ABL was negative.  Myeloid molecular panel showed mutations of ASXL1, CBL, DCGR689, SETBP1, SRSF2 and GATA2.     CT chest 3/21/22 but for follow-up of pulmonary nodules showed stable, small subcentimeter pulmonary nodules and COPD changes.  There is no suspicious lymphadenopathy.    He was seen as a new patient 5/5/22 for a hematology evaluation.  Laboratory testing showed progressive leukocytosis with a white count of 41.4.  There was mild anemia with microcytic, hyperchromic red cell indices, platelet count was normal.  He had no peripheral adenopathy, splenomegaly or B symptoms.  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  He was started on cytoreductive therapy with hydroxyurea 500 mg daily.     WBC improved, but he developed thrombocytopenia with a [place] count of 311[address].  Hydrea was reduced to 500 mg every other day.  His thrombocytopenia remains stable,  but he had recurrent leukocytosis.  He was changed to Hydrea 500 mg 6 days on 1 day off 7/22/22, and back to once a day on 10/5/22.    Interval History  He returns to the office today for a 2 week follow-up visit accompanied by his wife.  He is ambulatory with without assistance today.  He has lost weight since his last visit.  He is still recovering from his recent dental procedure.  It will be a few more weeks before his implants are complete.  He is eating primarily liquids and soft foods, and supplementing with Ensure.  He is taking his Hydrea daily as directed.  WBC has improved to 55, hemoglobin has improved to 10 but platelet count has decreased to 84,000.  He is still having occasional hematuria and gum bleeding.  He is not back on aspirin.  He denies any fevers, sweats, or abdominal pain.  He has no palpable adenopathy or splenomegaly on examination.      Review of Systems   Constitutional:  Positive for appetite change. Negative for fatigue and fever.        + Weight gain   HENT:  Positive for dental problem. Negative for mouth sores, sore throat and trouble swallowing.    Eyes: Negative.    Respiratory:  Negative for cough, chest tightness and shortness of breath.    Cardiovascular:  Negative for chest pain, palpitations and leg swelling.   Gastrointestinal:  Negative for abdominal distention, abdominal pain, blood in stool, change in bowel habit, constipation, diarrhea, nausea, vomiting and change in bowel habit.   Endocrine: Negative.    Genitourinary:  Positive for penile pain (with bleeding s/t recent surgery). Negative for dysuria, frequency and urgency.   Musculoskeletal:  Positive for arthralgias and back pain.   Integumentary:  Negative for rash and mole/lesion.   Neurological:  Negative for weakness.   Hematological:  Negative for adenopathy. Bruises/bleeds easily.       PMHx:  CAD, Hyperlipidemia, CVA, osteoarthritis, pneumonia, BPH  PSHx:  Tonsils, right hip replacement, AAA repair   SH:   Former heavy pipe smoker, quit 2019. Lives in [place] with his wife, retired .  FH:  His daughter had lung cancer.      Objective:     Physical Exam  Vitals reviewed.   Constitutional:       Comments: Thin white male in no acute distress   HENT:      Head: Normocephalic and atraumatic.      Nose: Nose normal.      Mouth/Throat:      Mouth: Mucous membranes are moist.      Pharynx: Oropharynx is clear. No posterior oropharyngeal erythema.   Eyes:      Extraocular Movements: Extraocular movements intact.      Conjunctiva/sclera: Conjunctivae normal.      Pupils: Pupils are equal, round, and reactive to light.   Cardiovascular:      Rate and Rhythm: Normal rate and regular rhythm.      Heart sounds: No murmur heard.  Pulmonary:      Breath sounds: Normal breath sounds.   Abdominal:      General: Abdomen is flat. Bowel sounds are normal. There is no distension.      Palpations: Abdomen is soft. There is no splenomegaly or mass.      Tenderness: There is no abdominal tenderness.   Musculoskeletal:         General: No swelling or tenderness. Normal range of motion.      Cervical back: Normal range of motion and neck supple. No tenderness.   Lymphadenopathy:      Comments: No palpable cervical, supraclavicular, axillary or inguinal adenopathy   Skin:     General: Skin is warm and dry.      Findings: Bruising (Scattered ecchymoses on arms) present. No lesion or rash.   Neurological:      General: No focal deficit present.      Mental Status: He is alert and oriented to person, place, and time.      Cranial Nerves: No cranial nerve deficit.      Gait: Gait normal.         LABORATORY  Recent Results (from the past 336 hour(s))   CBC with Differential    Collection Time: 11/21/22  2:19 PM   Result Value Ref Range    WBC 55.0 (HH) 4.5 - 11.5 x10(3)/mcL    RBC 3.47 (L) 4.70 - 6.10 x10(6)/mcL    Hgb 10.3 (L) 14.0 - 18.0 gm/dL    Hct 33.7 (L) 42.0 - 52.0 %    MCV 97.1 (H) 80.0 - 94.0 fL    MCH 29.7 27.0 - 31.0  pg    MCHC 30.6 (L) 33.0 - 36.0 mg/dL    RDW 17.1 (H) 11.5 - 17.0 %    Platelet 84 (L) 130 - 400 x10(3)/mcL    MPV 12.1 (H) 7.4 - 10.4 fL    IG# 2.09 (H) 0 - 0.04 x10(3)/mcL    IG% 3.8 %   Manual Differential    Collection Time: 11/21/22  2:19 PM   Result Value Ref Range    Neut Man 80 47 - 80 %    Lymph Man 3 (L) 13 - 40 %    Monocyte Man 16 (H) 2 - 11 %    [place] Man 1 %    Abs Neut calc 44.55 (H) 2.1 - 9.2 x10(3)/mcL    Abs Mono 8.8 (H) 0.1 - 1.3 x10(3)/mcL    Abs Lymp 1.65 0.6 - 4.6 x10(3)/mcL    RBC Morph Normal Normal    Platelet Est Decreased (A) Normal, Adequate             Assessment:   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 Gorman.  Continue Hydrea 500 mg daily.    CBC only in 2 weeks.    CBC and office follow-up in 4 weeks.  All questions answered.    KIRA STREETER, FNP-C    Other Phyisicans  Dr. Sinan Miller

## 2022-11-21 ENCOUNTER — OFFICE VISIT (OUTPATIENT)
Dept: HEMATOLOGY/ONCOLOGY | Facility: CLINIC | Age: 87
End: 2022-11-21
Payer: MEDICARE

## 2022-11-21 VITALS
SYSTOLIC BLOOD PRESSURE: 107 MMHG | DIASTOLIC BLOOD PRESSURE: 73 MMHG | HEART RATE: 80 BPM | BODY MASS INDEX: 19.3 KG/M2 | HEIGHT: 69 IN | TEMPERATURE: 98 F | WEIGHT: 130.31 LBS | RESPIRATION RATE: 18 BRPM

## 2022-11-21 DIAGNOSIS — D47.1 MYELOPROLIFERATIVE DISORDER: Primary | ICD-10-CM

## 2022-11-21 PROCEDURE — 1159F PR MEDICATION LIST DOCUMENTED IN MEDICAL RECORD: ICD-10-PCS | Mod: CPTII,S$GLB,, | Performed by: NURSE PRACTITIONER

## 2022-11-21 PROCEDURE — 3288F FALL RISK ASSESSMENT DOCD: CPT | Mod: CPTII,S$GLB,, | Performed by: NURSE PRACTITIONER

## 2022-11-21 PROCEDURE — 99214 PR OFFICE/OUTPT VISIT, EST, LEVL IV, 30-39 MIN: ICD-10-PCS | Mod: S$GLB,,, | Performed by: NURSE PRACTITIONER

## 2022-11-21 PROCEDURE — 1101F PT FALLS ASSESS-DOCD LE1/YR: CPT | Mod: CPTII,S$GLB,, | Performed by: NURSE PRACTITIONER

## 2022-11-21 PROCEDURE — 1126F PR PAIN SEVERITY QUANTIFIED, NO PAIN PRESENT: ICD-10-PCS | Mod: CPTII,S$GLB,, | Performed by: NURSE PRACTITIONER

## 2022-11-21 PROCEDURE — 99999 PR PBB SHADOW E&M-EST. PATIENT-LVL IV: ICD-10-PCS | Mod: PBBFAC,,, | Performed by: NURSE PRACTITIONER

## 2022-11-21 PROCEDURE — 1159F MED LIST DOCD IN RCRD: CPT | Mod: CPTII,S$GLB,, | Performed by: NURSE PRACTITIONER

## 2022-11-21 PROCEDURE — 1101F PR PT FALLS ASSESS DOC 0-1 FALLS W/OUT INJ PAST YR: ICD-10-PCS | Mod: CPTII,S$GLB,, | Performed by: NURSE PRACTITIONER

## 2022-11-21 PROCEDURE — 1160F PR REVIEW ALL MEDS BY PRESCRIBER/CLIN PHARMACIST DOCUMENTED: ICD-10-PCS | Mod: CPTII,S$GLB,, | Performed by: NURSE PRACTITIONER

## 2022-11-21 PROCEDURE — 1126F AMNT PAIN NOTED NONE PRSNT: CPT | Mod: CPTII,S$GLB,, | Performed by: NURSE PRACTITIONER

## 2022-11-21 PROCEDURE — 99214 OFFICE O/P EST MOD 30 MIN: CPT | Mod: S$GLB,,, | Performed by: NURSE PRACTITIONER

## 2022-11-21 PROCEDURE — 1160F RVW MEDS BY RX/DR IN RCRD: CPT | Mod: CPTII,S$GLB,, | Performed by: NURSE PRACTITIONER

## 2022-11-21 PROCEDURE — 3288F PR FALLS RISK ASSESSMENT DOCUMENTED: ICD-10-PCS | Mod: CPTII,S$GLB,, | Performed by: NURSE PRACTITIONER

## 2022-11-21 PROCEDURE — 99999 PR PBB SHADOW E&M-EST. PATIENT-LVL IV: CPT | Mod: PBBFAC,,, | Performed by: NURSE PRACTITIONER

## 2022-11-21 NOTE — LETTER
November 21, 2022        Sinan Miller MD  [address] [address]             [facility]  [address], [address]  [facility] [address]  Phone: [phone]   Patient: Francis J Colletta   MR Number: [id number]   YOB: 1933   Date of Visit: 11/21/2022       Dear Dr. Miller:    Thank you for referring Francis Colletta to me for evaluation. Attached you will find relevant portions of my assessment and plan of care.    If you have questions, please do not hesitate to call me. I look forward to following Francis Colletta along with you.    Sincerely,      Verónica Epps, GEOVANNY            CC    No Recipients    Enclosure

## 2022-12-05 ENCOUNTER — LAB VISIT (OUTPATIENT)
Dept: LAB | Facility: HOSPITAL | Age: 87
End: 2022-12-05
Attending: INTERNAL MEDICINE
Payer: MEDICARE

## 2022-12-05 DIAGNOSIS — D47.1 MYELOPROLIFERATIVE DISORDER: ICD-10-CM

## 2022-12-05 LAB
ALBUMIN SERPL-MCNC: 3.7 GM/DL (ref 3.4–4.8)
ALBUMIN/GLOB SERPL: 1.4 RATIO (ref 1.1–2)
ALP SERPL-CCNC: 488 UNIT/L (ref 40–150)
ALT SERPL-CCNC: 12 UNIT/L (ref 0–55)
AST SERPL-CCNC: 20 UNIT/L (ref 5–34)
BILIRUBIN DIRECT+TOT PNL SERPL-MCNC: 0.6 MG/DL
BUN SERPL-MCNC: 29 MG/DL (ref 8.4–25.7)
CALCIUM SERPL-MCNC: 9.3 MG/DL (ref 8.8–10)
CHLORIDE SERPL-SCNC: 106 MMOL/L (ref 98–107)
CO2 SERPL-SCNC: 26 MMOL/L (ref 23–31)
CREAT SERPL-MCNC: 2.07 MG/DL (ref 0.73–1.18)
GFR SERPLBLD CREATININE-BSD FMLA CKD-EPI: 30 MLS/MIN/1.73/M2
GLOBULIN SER-MCNC: 2.6 GM/DL (ref 2.4–3.5)
GLUCOSE SERPL-MCNC: 99 MG/DL (ref 82–115)
POTASSIUM SERPL-SCNC: 4.6 MMOL/L (ref 3.5–5.1)
PROT SERPL-MCNC: 6.3 GM/DL (ref 5.8–7.6)
SODIUM SERPL-SCNC: 142 MMOL/L (ref 136–145)

## 2022-12-05 PROCEDURE — 85007 BL SMEAR W/DIFF WBC COUNT: CPT

## 2022-12-05 PROCEDURE — 80053 COMPREHEN METABOLIC PANEL: CPT

## 2022-12-05 PROCEDURE — 36415 COLL VENOUS BLD VENIPUNCTURE: CPT

## 2022-12-05 PROCEDURE — 85025 COMPLETE CBC W/AUTO DIFF WBC: CPT

## 2022-12-06 ENCOUNTER — HOSPITAL ENCOUNTER (OUTPATIENT)
Facility: HOSPITAL | Age: 87
Discharge: HOME | End: 2022-12-06
Attending: UROLOGY | Admitting: UROLOGY
Payer: MEDICARE

## 2022-12-06 ENCOUNTER — ANESTHESIA EVENT (OUTPATIENT)
Dept: SURGERY | Facility: HOSPITAL | Age: 87
End: 2022-12-06
Payer: MEDICARE

## 2022-12-06 ENCOUNTER — ANESTHESIA (OUTPATIENT)
Dept: SURGERY | Facility: HOSPITAL | Age: 87
End: 2022-12-06
Payer: MEDICARE

## 2022-12-06 VITALS
SYSTOLIC BLOOD PRESSURE: 109 MMHG | RESPIRATION RATE: 20 BRPM | BODY MASS INDEX: 19.26 KG/M2 | OXYGEN SATURATION: 97 % | WEIGHT: 130.06 LBS | TEMPERATURE: 99 F | HEIGHT: 69 IN | DIASTOLIC BLOOD PRESSURE: 70 MMHG | HEART RATE: 70 BPM

## 2022-12-06 DIAGNOSIS — D47.1 MYELOPROLIFERATIVE DISORDER: Primary | ICD-10-CM

## 2022-12-06 DIAGNOSIS — S30.22XA SCROTAL HEMATOMA: Primary | ICD-10-CM

## 2022-12-06 LAB
ABS NEUT CALC (OHS): 60.16 X10(3)/MCL (ref 2.1–9.2)
ANISOCYTOSIS BLD QL SMEAR: SLIGHT
BASOPHILS # BLD AUTO: 0.14 X10(3)/MCL (ref 0–0.2)
BASOPHILS NFR BLD AUTO: 0.2 %
EOSINOPHIL # BLD AUTO: 0.11 X10(3)/MCL (ref 0–0.9)
EOSINOPHIL NFR BLD AUTO: 0.2 %
EOSINOPHIL NFR BLD MANUAL: 0.68 X10(3)/MCL (ref 0–0.9)
EOSINOPHIL NFR BLD MANUAL: 1 % (ref 0–8)
ERYTHROCYTE [DISTWIDTH] IN BLOOD BY AUTOMATED COUNT: 17.2 % (ref 11.5–17)
HCT VFR BLD AUTO: 35.9 % (ref 42–52)
HEMATOLOGIST REVIEW: NORMAL
HGB BLD-MCNC: 10.8 GM/DL (ref 14–18)
IMM GRANULOCYTES # BLD AUTO: 3.93 X10(3)/MCL (ref 0–0.04)
IMM GRANULOCYTES NFR BLD AUTO: 5.8 %
LYMPHOCYTES # BLD AUTO: 2.54 X10(3)/MCL (ref 0.6–4.6)
LYMPHOCYTES NFR BLD AUTO: 3.8 %
LYMPHOCYTES NFR BLD MANUAL: 2.03 X10(3)/MCL
LYMPHOCYTES NFR BLD MANUAL: 3 % (ref 13–40)
MCH RBC QN AUTO: 29.3 PG (ref 27–31)
MCHC RBC AUTO-ENTMCNC: 30.1 MG/DL (ref 33–36)
MCV RBC AUTO: 97.6 FL (ref 80–94)
METAMYELOCYTES NFR BLD MANUAL: 2 %
MONOCYTES # BLD AUTO: 6.87 X10(3)/MCL (ref 0.1–1.3)
MONOCYTES NFR BLD AUTO: 10.2 %
MONOCYTES NFR BLD MANUAL: 4.73 X10(3)/MCL (ref 0.1–1.3)
MONOCYTES NFR BLD MANUAL: 7 % (ref 2–11)
MYELOCYTES NFR BLD MANUAL: 1 %
NEUTROPHILS # BLD AUTO: 54 X10(3)/MCL (ref 2.1–9.2)
NEUTROPHILS NFR BLD AUTO: 79.8 %
NEUTROPHILS NFR BLD MANUAL: 78 % (ref 47–80)
NEUTS BAND NFR BLD MANUAL: 8 % (ref 0–11)
NRBC BLD MANUAL-RTO: 1 %
PLATELET # BLD AUTO: 95 X10(3)/MCL (ref 130–400)
PLATELET # BLD EST: ADEQUATE 10*3/UL
PMV BLD AUTO: 11.5 FL (ref 7.4–10.4)
RBC # BLD AUTO: 3.68 X10(6)/MCL (ref 4.7–6.1)
RBC MORPH BLD: ABNORMAL
WBC # SPEC AUTO: 67.6 X10(3)/MCL (ref 4.5–11.5)

## 2022-12-06 PROCEDURE — 37000009 HC ANESTHESIA EA ADD 15 MINS: Performed by: UROLOGY

## 2022-12-06 PROCEDURE — 71000033 HC RECOVERY, INTIAL HOUR: Performed by: UROLOGY

## 2022-12-06 PROCEDURE — 36415 COLL VENOUS BLD VENIPUNCTURE: CPT | Performed by: NURSE PRACTITIONER

## 2022-12-06 PROCEDURE — 71000015 HC POSTOP RECOV 1ST HR: Performed by: UROLOGY

## 2022-12-06 PROCEDURE — C1729 CATH, DRAINAGE: HCPCS | Performed by: UROLOGY

## 2022-12-06 PROCEDURE — 36000707: Performed by: UROLOGY

## 2022-12-06 PROCEDURE — 25000003 PHARM REV CODE 250

## 2022-12-06 PROCEDURE — 36000706: Performed by: UROLOGY

## 2022-12-06 PROCEDURE — 37000008 HC ANESTHESIA 1ST 15 MINUTES: Performed by: UROLOGY

## 2022-12-06 PROCEDURE — 63600175 PHARM REV CODE 636 W HCPCS: Performed by: UROLOGY

## 2022-12-06 PROCEDURE — 71000016 HC POSTOP RECOV ADDL HR: Performed by: UROLOGY

## 2022-12-06 PROCEDURE — 63600175 PHARM REV CODE 636 W HCPCS

## 2022-12-06 RX ORDER — EPHEDRINE SULFATE 50 MG/ML
INJECTION, SOLUTION INTRAVENOUS
Status: DISCONTINUED | OUTPATIENT
Start: 2022-12-06 | End: 2022-12-06

## 2022-12-06 RX ORDER — SODIUM CHLORIDE 900 MG/100ML
INJECTION INTRAVENOUS
Status: DISCONTINUED
Start: 2022-12-06 | End: 2022-12-06 | Stop reason: HOSPADM

## 2022-12-06 RX ORDER — FENTANYL CITRATE 50 UG/ML
25 INJECTION, SOLUTION INTRAMUSCULAR; INTRAVENOUS EVERY 5 MIN PRN
Status: DISCONTINUED | OUTPATIENT
Start: 2022-12-06 | End: 2022-12-06 | Stop reason: HOSPADM

## 2022-12-06 RX ORDER — CEFTRIAXONE 250 MG/1
INJECTION, POWDER, FOR SOLUTION INTRAMUSCULAR; INTRAVENOUS
Status: DISCONTINUED | OUTPATIENT
Start: 2022-12-06 | End: 2022-12-06

## 2022-12-06 RX ORDER — LIDOCAINE HCL/PF 100 MG/5ML
SYRINGE (ML) INTRAVENOUS
Status: DISCONTINUED | OUTPATIENT
Start: 2022-12-06 | End: 2022-12-06

## 2022-12-06 RX ORDER — ACETAMINOPHEN 10 MG/ML
1000 INJECTION, SOLUTION INTRAVENOUS ONCE
Status: DISCONTINUED | OUTPATIENT
Start: 2022-12-06 | End: 2022-12-06 | Stop reason: HOSPADM

## 2022-12-06 RX ORDER — ONDANSETRON 2 MG/ML
INJECTION INTRAMUSCULAR; INTRAVENOUS
Status: DISCONTINUED | OUTPATIENT
Start: 2022-12-06 | End: 2022-12-06

## 2022-12-06 RX ORDER — DEXAMETHASONE SODIUM PHOSPHATE 4 MG/ML
INJECTION, SOLUTION INTRA-ARTICULAR; INTRALESIONAL; INTRAMUSCULAR; INTRAVENOUS; SOFT TISSUE
Status: DISCONTINUED | OUTPATIENT
Start: 2022-12-06 | End: 2022-12-06

## 2022-12-06 RX ORDER — FAMOTIDINE 10 MG/ML
20 INJECTION INTRAVENOUS ONCE
Status: CANCELLED | OUTPATIENT
Start: 2022-12-06

## 2022-12-06 RX ORDER — TRAMADOL HYDROCHLORIDE 50 MG/1
50 TABLET ORAL EVERY 6 HOURS PRN
Qty: 20 EACH | Refills: 0 | Status: SHIPPED | OUTPATIENT
Start: 2022-12-06

## 2022-12-06 RX ORDER — FENTANYL CITRATE 50 UG/ML
INJECTION, SOLUTION INTRAMUSCULAR; INTRAVENOUS
Status: DISCONTINUED | OUTPATIENT
Start: 2022-12-06 | End: 2022-12-06

## 2022-12-06 RX ORDER — CEFTRIAXONE 1 G/1
INJECTION, POWDER, FOR SOLUTION INTRAMUSCULAR; INTRAVENOUS
Status: DISCONTINUED
Start: 2022-12-06 | End: 2022-12-06 | Stop reason: HOSPADM

## 2022-12-06 RX ORDER — SODIUM CHLORIDE 0.9 % (FLUSH) 0.9 %
10 SYRINGE (ML) INJECTION
Status: DISCONTINUED | OUTPATIENT
Start: 2022-12-06 | End: 2022-12-06 | Stop reason: HOSPADM

## 2022-12-06 RX ORDER — PROPOFOL 10 MG/ML
INJECTION, EMULSION INTRAVENOUS
Status: DISCONTINUED | OUTPATIENT
Start: 2022-12-06 | End: 2022-12-06

## 2022-12-06 RX ORDER — PHENYLEPHRINE HCL IN 0.9% NACL 1 MG/10 ML
SYRINGE (ML) INTRAVENOUS
Status: DISCONTINUED | OUTPATIENT
Start: 2022-12-06 | End: 2022-12-06

## 2022-12-06 RX ORDER — LIDOCAINE HYDROCHLORIDE 10 MG/ML
1 INJECTION, SOLUTION EPIDURAL; INFILTRATION; INTRACAUDAL; PERINEURAL ONCE
Status: CANCELLED | OUTPATIENT
Start: 2022-12-06 | End: 2022-12-06

## 2022-12-06 RX ORDER — HYDROMORPHONE HYDROCHLORIDE 2 MG/ML
0.5 INJECTION, SOLUTION INTRAMUSCULAR; INTRAVENOUS; SUBCUTANEOUS EVERY 5 MIN PRN
Status: DISCONTINUED | OUTPATIENT
Start: 2022-12-06 | End: 2022-12-06 | Stop reason: HOSPADM

## 2022-12-06 RX ADMIN — EPHEDRINE SULFATE 10 MG: 50 INJECTION INTRAVENOUS at 02:12

## 2022-12-06 RX ADMIN — LIDOCAINE HYDROCHLORIDE 50 MG: 20 INJECTION, SOLUTION INTRAVENOUS at 02:12

## 2022-12-06 RX ADMIN — CEFTRIAXONE SODIUM 1 G: 250 INJECTION, POWDER, FOR SOLUTION INTRAMUSCULAR; INTRAVENOUS at 02:12

## 2022-12-06 RX ADMIN — PROPOFOL 50 MG: 10 INJECTION, EMULSION INTRAVENOUS at 02:12

## 2022-12-06 RX ADMIN — FENTANYL CITRATE 100 MCG: 50 INJECTION, SOLUTION INTRAMUSCULAR; INTRAVENOUS at 02:12

## 2022-12-06 RX ADMIN — SODIUM CHLORIDE, SODIUM GLUCONATE, SODIUM ACETATE, POTASSIUM CHLORIDE AND MAGNESIUM CHLORIDE: 526; 502; 368; 37; 30 INJECTION, SOLUTION INTRAVENOUS at 01:12

## 2022-12-06 RX ADMIN — ONDANSETRON 4 MG: 2 INJECTION INTRAMUSCULAR; INTRAVENOUS at 02:12

## 2022-12-06 RX ADMIN — Medication 100 MCG: at 02:12

## 2022-12-06 RX ADMIN — HYDROMORPHONE HYDROCHLORIDE 0.5 MG: 2 INJECTION INTRAMUSCULAR; INTRAVENOUS; SUBCUTANEOUS at 03:12

## 2022-12-06 RX ADMIN — DEXAMETHASONE SODIUM PHOSPHATE 4 MG: 4 INJECTION, SOLUTION INTRA-ARTICULAR; INTRALESIONAL; INTRAMUSCULAR; INTRAVENOUS; SOFT TISSUE at 02:12

## 2022-12-06 NOTE — DISCHARGE SUMMARY
[facility] - Periop Services  Discharge Note  Short Stay    Procedure(s) (LRB):  EXPLORATION, SCROTUM (N/A)      OUTCOME: Patient tolerated treatment/procedure well without complication and is now ready for discharge.    DISPOSITION: Home or Self Care    FINAL DIAGNOSIS:  Scrotal hematoma    FOLLOWUP: In clinic    DISCHARGE INSTRUCTIONS:  No discharge procedures on file.     TIME SPENT ON DISCHARGE: 10 minutes

## 2022-12-06 NOTE — ANESTHESIA POSTPROCEDURE EVALUATION
Anesthesia Post Evaluation    Patient: Francis J Colletta    Procedure(s) Performed: Procedure(s) (LRB):  EXPLORATION, SCROTUM (N/A)    Final Anesthesia Type: general      Patient location during evaluation: PACU  Patient participation: Yes- Able to Participate  Level of consciousness: awake and alert  Post-procedure vital signs: reviewed and stable  Pain management: adequate  Airway patency: patent    PONV status at discharge: No PONV  Anesthetic complications: no      Cardiovascular status: blood pressure returned to baseline  Respiratory status: spontaneous ventilation and unassisted  Hydration status: euvolemic  Follow-up needed           Vitals Value Taken Time   BP 96/60 12/06/22 1520   Temp 98 12/06/22 1529   Pulse 71 12/06/22 1528   Resp 27 12/06/22 1528   SpO2 100 % 12/06/22 1528   Vitals shown include unvalidated device data.      No case tracking events are documented in the log.      Pain/Amisha Score: Amisha Score: 8 (12/6/2022  3:00 PM)

## 2022-12-06 NOTE — ANESTHESIA PROCEDURE NOTES
Intubation    Date/Time: 12/6/2022 2:04 PM  Performed by: Roberta Chowdhury  Authorized by: Tex Gastelum DO     Intubation:     Induction:  Intravenous    Intubated:  Postinduction    Mask Ventilation:  Easy mask    Attempts:  1    Attempted By:  Student    Difficult Airway Encountered?: No      Airway Device:  Supraglottic airway/LMA    Airway Device Size:  4.0    Style/Cuff Inflation:  Cuffed (inflated to minimal occlusive pressure)    Secured at:  The lips    Placement Verified By:  Capnometry    Complicating Factors:  None    Findings Post-Intubation:  BS equal bilateral and atraumatic/condition of teeth unchanged

## 2022-12-06 NOTE — ANESTHESIA PREPROCEDURE EVALUATION
12/06/2022  Francis J Colletta is a 89 y.o., male.      Pre-op Assessment    I have reviewed the Patient Summary Reports.     I have reviewed the Nursing Notes. I have reviewed the NPO Status.   I have reviewed the Medications.     Review of Systems  Anesthesia Hx:  No problems with previous Anesthesia    Social:  Former Smoker Quit smoking 2019   Hematology/Oncology:        Hematology Comments: WBC myeloproliferative disorder  WBC 67    Hx thrombocytopenia  Platelets 95    H/H 16.8/35.9   Cardiovascular:   Hypertension Denies MI. CAD     Denies Angina.  Denies CHF. hyperlipidemia    Pulmonary:   Denies COPD.  Denies Asthma.    Renal/:   Denies Chronic Renal Disease. no renal calculi     Hepatic/GI:   Denies GERD. Denies Liver Disease.  Denies Hepatitis.    Neurological:   CVA (MCA stroke - affected speech, mouth) Denies Seizures.    Endocrine:   Denies Diabetes. Denies Hypothyroidism. Denies Hyperthyroidism.  Denies Obesity / BMI > 30      Physical Exam  General: Well nourished, Cooperative, Alert and Oriented    Airway:  Mallampati: I   Mouth Opening: Normal  TM Distance: Normal  Tongue: Normal  Neck ROM: Normal ROM    Dental:  Periodontal disease        Anesthesia Plan  Type of Anesthesia, risks & benefits discussed:    Anesthesia Type: Gen Supraglottic Airway  Intra-op Monitoring Plan: Standard ASA Monitors  Induction:  IV  Informed Consent: Informed consent signed with the Patient and all parties understand the risks and agree with anesthesia plan.  All questions answered.   ASA Score: 4  Day of Surgery Review of History & Physical: H&P Update referred to the surgeon/provider.    Ready For Surgery From Anesthesia Perspective.     .

## 2022-12-06 NOTE — OP NOTE
Francis J Colletta   11/4/1933   [id number]     Date of Procedure: 12/6/2022              PreOP Dx:  Pre-Op Diagnosis Codes:     * Scrotal hematoma [S30.22XA]     Post Op Dx: Post-Op Diagnosis Codes:     * Scrotal hematoma [S30.22XA]       Procedure:  Procedure(s):  EXPLORATION, SCROTUM     Evacuation of Hematoma     Scrotal Drain placement      Surgeon:  Moisés Carbone MD      EBL: <5cc      Procedure:       After informed consent was obtained, the patient was taken to the operating room after receiving a preoperative dose of antibiotics.  He was given a general anesthetic in supine position his genitals were prepped and draped in usual sterile fashion.  Using a 15. Blade I made a horizontal incision through the previous penoscrotal incision and opened up the scrotum to encounter hematoma which was evacuated out with finger dissection and suction.  I irrigated the scrotum with 1 L of experience and obtained hemostasis with the cautery.  I placed a 15 Zachary drain from the dependent portion of the scrotum out to the right inguinal area.  I closed the scrotum with a running layer of 2-0 Vicryl suture followed by running 4-0 Monocryl subcuticular stitch for the skin.  He tolerated the procedure well there were no complications he was extubated and brought to recovery in good condition.

## 2022-12-13 NOTE — DISCHARGE SUMMARY
Discharge Summary    Francis J Colletta   11/4/1933   [id number]     Date of Admission: 10/21/2022    Date of Discharge: 10/229425      CC:  Problems with penile prosthesis    Hisotry of Present Illness:      89-year-old male with a history of inflatable penile prosthesis who has an impending erosion distally in the right cylinder.  He is being admitted electively for removal of his implant        Hospital Course:      Patient was admitted to the hospital on 10/21/2022 was taken to the operating room where he underwent removal of his inflatable penile prosthesis through a penoscrotal incision was well tolerated and uncomplicated.  Postoperatively was brought back to day surgery and discharged home in good condition.  He will follow up with me next week.

## 2022-12-16 NOTE — PROGRESS NOTES
Subjective:       Patient ID: Francis J Colletta is a 89 y.o. male.    Chief Complaint:  I had another procedure    Diagnosis:  Myeloproliferative disorder    Current Treatment: Hydroxyurea 500 mg/d (started 5/06/22) -->QOD 5/31/22 -> 6 days on/1 day 7/22/22 -->QD 10/5/22    Clinical History:  Patient referred by his PCP for progressive leukocytosis.  Review of previous laboratory showed a WBC count averaging between 15K to 20K dating back to at least 8/19.  Differential showed a mild left shift and no absolute lymphocytosis.  CBC 03/15/22 showed a WBC count of 23.4 with a differential of 70% neutrophils, 14% lymphocytes, 10% monocytes, 2% metas and 2% myelocytes.  Hemoglobin was 14.1, hematocrit 45.7 and platelets 153. Peripheral blood flow cytometry showed no abnormal monoclonal cell populations. Bone marrow aspirate and biopsy 4/19/22 showed a hypercellular marrow with granulocytic hyperplasia and 5.8% myeloblasts.  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  Cytogenetics were normal 46, XY.  FISH for BCR-ABL was negative.  Myeloid molecular panel showed mutations of ASXL1, CBL, [id number], SETBP1, SRSF2 and GATA2.     CT chest 3/21/22 but for follow-up of pulmonary nodules showed stable, small subcentimeter pulmonary nodules and COPD changes.  There is no suspicious lymphadenopathy.    He was seen as a new patient 5/5/22 for a hematology evaluation.  Laboratory testing showed progressive leukocytosis with a white count of 41.4.  There was mild anemia with microcytic, hyperchromic red cell indices, platelet count was normal.  He had no peripheral adenopathy, splenomegaly or B symptoms.  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  He was started on cytoreductive therapy with hydroxyurea 500 mg daily.     WBC improved, but he developed thrombocytopenia with a platelet count of 658[address].  Hydrea was reduced to 500 mg every other day.  His thrombocytopenia  remains stable, but he had recurrent leukocytosis.  He was changed to Hydrea 500 mg 6 days on 1 day off 7/22/22, and back to once a day on 10/5/22.    Interval History  Mr. Saravia is here today in follow-up accompanied by his son.  He required evacuation of a scrotal hematoma 12/6/22.   He has continued to have mild intermittent bleeding from the surgical site.  He has an upcoming appointment with Urology.  His appetite remains poor but he is maintaining his weight.  He is taking his Hydrea daily as directed.  He denies any fevers, sweats, or abdominal pain.  He has no palpable adenopathy or splenomegaly on examination.  Laboratory today shows persistent leukocytosis with a [place] [address].  Thrombocytopenia stable to slightly worse at 74,000. Results discussed with patient and son.    Review of Systems   Constitutional:  Positive for appetite change. Negative for fatigue and fever.        + Weight gain   HENT:  Positive for dental problem. Negative for mouth sores, sore throat and trouble swallowing.    Eyes: Negative.    Respiratory:  Negative for cough, chest tightness and shortness of breath.    Cardiovascular:  Negative for chest pain, palpitations and leg swelling.   Gastrointestinal:  Negative for abdominal distention, abdominal pain, blood in stool, change in bowel habit, constipation, diarrhea, nausea, vomiting and change in bowel habit.   Endocrine: Negative.    Genitourinary:  Positive for penile pain (with bleeding s/t recent surgery). Negative for dysuria, frequency and urgency.   Musculoskeletal:  Positive for arthralgias and back pain.   Integumentary:  Negative for rash and mole/lesion.   Neurological:  Negative for weakness.   Hematological:  Negative for adenopathy. Bruises/bleeds easily.       PMHx:  CAD, Hyperlipidemia, CVA, osteoarthritis, pneumonia, BPH  PSHx:  Tonsils, right hip replacement, AAA repair   SH:  Former heavy pipe smoker, quit 2019. Lives in [place] with his wife,  retired .  FH:  His daughter had lung cancer.      Objective:     Physical Exam  Vitals reviewed.   Constitutional:       Comments: Thin white male in no acute distress   HENT:      Head: Normocephalic and atraumatic.      Nose: Nose normal.      Mouth/Throat:      Mouth: Mucous membranes are moist.      Pharynx: Oropharynx is clear. No posterior oropharyngeal erythema.   Eyes:      Extraocular Movements: Extraocular movements intact.      Conjunctiva/sclera: Conjunctivae normal.      Pupils: Pupils are equal, round, and reactive to light.   Cardiovascular:      Rate and Rhythm: Normal rate and regular rhythm.      Heart sounds: No murmur heard.  Pulmonary:      Breath sounds: Normal breath sounds.   Abdominal:      General: Abdomen is flat. Bowel sounds are normal. There is no distension.      Palpations: Abdomen is soft. There is no splenomegaly or mass.      Tenderness: There is no abdominal tenderness.   Musculoskeletal:         General: No swelling or tenderness. Normal range of motion.      Cervical back: Normal range of motion and neck supple. No tenderness.   Lymphadenopathy:      Comments: No palpable cervical, supraclavicular, axillary or inguinal adenopathy   Skin:     General: Skin is warm and dry.      Findings: Bruising (Scattered ecchymoses on arms) present. No lesion or rash.   Neurological:      General: No focal deficit present.      Mental Status: He is alert and oriented to person, place, and time.      Cranial Nerves: No cranial nerve deficit.      Gait: Gait normal.         LABORATORY  Lab Results   Component Value Date/Time    WBC 67.5 (HH) 12/20/2022 12:35 PM    WBC 7.34 08/01/2018 04:57 AM    RBC 3.29 (L) 12/20/2022 12:35 PM    RBC 5.14 08/01/2018 04:57 AM    HGB 9.6 (L) 12/20/2022 12:35 PM    HGB 14.9 08/01/2018 04:57 AM    HCT 31.7 (L) 12/20/2022 12:35 PM    HCT 46.1 08/01/2018 04:57 AM    PLT 74 (L) 12/20/2022 12:35 PM     08/01/2018 04:57 AM    ABSNEUTRO 54.41  (H) 12/20/2022 12:35 PM    NEUTROAUTO 80.6 12/20/2022 12:35 PM    ABSLYMPH 2.39 12/20/2022 12:35 PM    LYMPHAUTO 3.5 12/20/2022 12:35 PM    ABSEOS 0.09 12/20/2022 12:35 PM    EOSAUTO 0.1 12/20/2022 12:35 PM    ABSBASO 0.12 12/20/2022 12:35 PM    BASOPHILAUTO 0.2 12/20/2022 12:35 PM        Chemistry        Component Value Date/Time     12/05/2022 1342     08/01/2018 0457    K 4.6 12/05/2022 1342    K 5.1 08/01/2018 0457     08/01/2018 0457    CO2 26 12/05/2022 1342    CO2 34 (H) 08/01/2018 0457    BUN 29.0 (H) 12/05/2022 1342    BUN 18 08/01/2018 0457    CREATININE 2.07 (H) 12/05/2022 1342    CREATININE 1.0 08/01/2018 0457    GLU 93 08/01/2018 0457        Component Value Date/Time    CALCIUM 9.3 12/05/2022 1342    CALCIUM 9.6 08/01/2018 0457    ALKPHOS 488 (H) 12/05/2022 1342    ALKPHOS 75 08/01/2018 0457    AST 20 12/05/2022 1342    AST 19 08/01/2018 0457    ALT 12 12/05/2022 1342    ALT 12 08/01/2018 0457    BILITOT 0.6 12/05/2022 1342    BILITOT 0.7 08/01/2018 0457    ESTGFRAFRICA >60 08/01/2018 0457    EGFRNONAA 45 05/31/2022 1344    EGFRNONAA >60 08/01/2018 0457          Assessment:   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  Continue close monitoring of bleeding and maintain follow-up with Urology.    CBC only 01/03/2023.    Return to clinic in 4 weeks for follow-up with Dr. Gorman with same-day CBC.  All questions answered.    KIRA STREETER, FNP-C    Other Phyisicans  Dr. Sinan Miller

## 2022-12-20 ENCOUNTER — OFFICE VISIT (OUTPATIENT)
Dept: HEMATOLOGY/ONCOLOGY | Facility: CLINIC | Age: 87
End: 2022-12-20
Payer: MEDICARE

## 2022-12-20 VITALS
HEART RATE: 76 BPM | HEIGHT: 69 IN | TEMPERATURE: 99 F | BODY MASS INDEX: 19.7 KG/M2 | RESPIRATION RATE: 18 BRPM | OXYGEN SATURATION: 95 % | DIASTOLIC BLOOD PRESSURE: 79 MMHG | SYSTOLIC BLOOD PRESSURE: 123 MMHG | WEIGHT: 133 LBS

## 2022-12-20 DIAGNOSIS — D47.1 MYELOPROLIFERATIVE DISORDER: Primary | ICD-10-CM

## 2022-12-20 PROCEDURE — 1126F PR PAIN SEVERITY QUANTIFIED, NO PAIN PRESENT: ICD-10-PCS | Mod: CPTII,S$GLB,, | Performed by: NURSE PRACTITIONER

## 2022-12-20 PROCEDURE — 99214 PR OFFICE/OUTPT VISIT, EST, LEVL IV, 30-39 MIN: ICD-10-PCS | Mod: S$GLB,,, | Performed by: NURSE PRACTITIONER

## 2022-12-20 PROCEDURE — 1160F PR REVIEW ALL MEDS BY PRESCRIBER/CLIN PHARMACIST DOCUMENTED: ICD-10-PCS | Mod: CPTII,S$GLB,, | Performed by: NURSE PRACTITIONER

## 2022-12-20 PROCEDURE — 99999 PR PBB SHADOW E&M-EST. PATIENT-LVL IV: ICD-10-PCS | Mod: PBBFAC,,, | Performed by: NURSE PRACTITIONER

## 2022-12-20 PROCEDURE — 1160F RVW MEDS BY RX/DR IN RCRD: CPT | Mod: CPTII,S$GLB,, | Performed by: NURSE PRACTITIONER

## 2022-12-20 PROCEDURE — 1159F PR MEDICATION LIST DOCUMENTED IN MEDICAL RECORD: ICD-10-PCS | Mod: CPTII,S$GLB,, | Performed by: NURSE PRACTITIONER

## 2022-12-20 PROCEDURE — 99214 OFFICE O/P EST MOD 30 MIN: CPT | Mod: S$GLB,,, | Performed by: NURSE PRACTITIONER

## 2022-12-20 PROCEDURE — 1126F AMNT PAIN NOTED NONE PRSNT: CPT | Mod: CPTII,S$GLB,, | Performed by: NURSE PRACTITIONER

## 2022-12-20 PROCEDURE — 1159F MED LIST DOCD IN RCRD: CPT | Mod: CPTII,S$GLB,, | Performed by: NURSE PRACTITIONER

## 2022-12-20 PROCEDURE — 99999 PR PBB SHADOW E&M-EST. PATIENT-LVL IV: CPT | Mod: PBBFAC,,, | Performed by: NURSE PRACTITIONER

## 2022-12-22 ENCOUNTER — LAB REQUISITION (OUTPATIENT)
Dept: LAB | Facility: HOSPITAL | Age: 87
End: 2022-12-22
Payer: MEDICARE

## 2022-12-22 DIAGNOSIS — N39.0 URINARY TRACT INFECTION, SITE NOT SPECIFIED: ICD-10-CM

## 2022-12-22 PROCEDURE — 87088 URINE BACTERIA CULTURE: CPT | Performed by: UROLOGY

## 2022-12-24 LAB — BACTERIA UR CULT: NO GROWTH

## 2023-01-19 NOTE — PROGRESS NOTES
Subjective:       Patient ID: Francis J Colletta is a 89 y.o. male.    Chief Complaint:  Intermittent bleeding from scrotum    Diagnosis:  Myeloproliferative disorder    Current Treatment: Hydroxyurea started 5/06/22 - current dose  500 mg alternating w/1000 mg since 12/20/22    Clinical History:  Patient referred by his PCP for progressive leukocytosis.  Review of previous laboratory showed a WBC count averaging between 15K to 20K dating back to at least 8/19.  Differential showed a mild left shift and no absolute lymphocytosis.  CBC 03/15/22 showed a WBC count of 23.4 with a differential of 70% neutrophils, 14% lymphocytes, 10% monocytes, 2% metas and 2% myelocytes.  Hemoglobin was 14.1, hematocrit 45.7 and platelets 153. Peripheral blood flow cytometry showed no abnormal monoclonal cell populations. Bone marrow aspirate and biopsy 4/19/22 showed a hypercellular marrow with granulocytic hyperplasia and 5.8% myeloblasts.  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  Cytogenetics were normal 46, XY.  FISH for BCR-ABL was negative.  Myeloid molecular panel showed mutations of ASXL1, CBL, KTIZ286, SETBP1, SRSF2 and GATA2.     CT chest 3/21/22 but for follow-up of pulmonary nodules showed stable, small subcentimeter pulmonary nodules and COPD changes.  There is no suspicious lymphadenopathy.    He was seen as a new patient 5/5/22 for a hematology evaluation.  Laboratory testing showed progressive leukocytosis with a white count of 41.4.  There was mild anemia with microcytic, hyperchromic red cell indices, platelet count was normal.  He had no peripheral adenopathy, splenomegaly or B symptoms.  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  He was started on cytoreductive therapy with hydroxyurea 500 mg daily.     His dose has had to be adjusted secondary to thrombocytopenia.  He underwent removal of a penile implant 11/22.  Postoperative course was complicated  by development of a scrotal hematoma requiring evacuation 12/6/22.  Following that procedure, he continued to have intermittent bleeding from the surgical site.  Symptoms persisted despite additional surgical intervention.  He has remained thrombocytopenic on Hydrea.    Interval History  He returns to the office today for a 4 week follow-up visit accompanied by his son.  He is currently on Hydrea 500 mg every other day alternating with 1000 mg.  His leukocytosis has improved, he has stable anemia but progressive thrombocytopenia.  Platelet count 2 weeks ago was 58,000 and today is 38,000. He is still having intermittent, small volume bleeding from the scrotal lesion.  He denies any gross hematuria.  He reports a bladder tumor was seen on cystoscopy by Urology.  He is scheduled for TURBT 2/28/23.  He denies any epistaxis or gum bleeding.  No fever, chills, night sweats or weight loss.    Review of Systems   Constitutional:  Positive for fatigue. Negative for appetite change, fever and unexpected weight change.   HENT:  Negative for mouth sores, nosebleeds, sore throat and trouble swallowing.    Eyes: Negative.    Respiratory:  Negative for cough and shortness of breath.    Cardiovascular:  Negative for chest pain, palpitations and leg swelling.   Gastrointestinal:  Negative for abdominal distention, abdominal pain, blood in stool, constipation, diarrhea, nausea and vomiting.   Endocrine: Negative.    Genitourinary:  Negative for dysuria, frequency, hematuria, penile pain and urgency.        Intermittent bleeding from scrotal surgical site   Musculoskeletal:  Positive for arthralgias and back pain.   Integumentary:  Negative for rash.   Neurological:  Negative for dizziness, weakness, numbness and headaches.   Hematological:  Negative for adenopathy. Bruises/bleeds easily.   Psychiatric/Behavioral: Negative.         PMHx:  CAD, Hyperlipidemia, CVA, osteoarthritis, pneumonia, BPH  PSHx:  Tonsils, right hip replacement,  AAA repair   SH:  Former heavy pipe smoker, quit 2019. Lives in [place] with his wife, retired .  FH:  His daughter had lung cancer.    Objective:     Physical Exam  Vitals reviewed.   Constitutional:       Comments: Thin, elderly white male in no acute distress   HENT:      Head: Normocephalic and atraumatic.      Mouth/Throat:      Mouth: Mucous membranes are moist.      Pharynx: Oropharynx is clear. No posterior oropharyngeal erythema.   Eyes:      Extraocular Movements: Extraocular movements intact.      Conjunctiva/sclera: Conjunctivae normal.      Pupils: Pupils are equal, round, and reactive to light.   Cardiovascular:      Rate and Rhythm: Normal rate and regular rhythm.      Heart sounds: No murmur heard.  Pulmonary:      Comments: Lungs clear to auscultation  Abdominal:      General: Abdomen is flat. Bowel sounds are normal. There is no distension.      Palpations: Abdomen is soft. There is no splenomegaly.      Tenderness: There is no abdominal tenderness.   Musculoskeletal:         General: No swelling or tenderness. Normal range of motion.      Cervical back: Normal range of motion and neck supple. No tenderness.   Lymphadenopathy:      Comments: No palpable cervical, supraclavicular, axillary or inguinal adenopathy   Skin:     General: Skin is warm and dry.      Findings: Bruising (Scattered ecchymoses on arms) present. No rash.   Neurological:      General: No focal deficit present.      Mental Status: He is alert and oriented to person, place, and time.      Cranial Nerves: No cranial nerve deficit.         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  He will take Hydrea 500 mg daily Monday through Friday and 1000 mg on Saturday and Sunday.  Repeat CBC in 2 weeks at [facility].  RTC in 4 weeks for a follow-up visit and repeat laboratory prior to his scheduled TURBT.  If no improvement in his thrombocytopenia, may require platelet transfusion prior to TURBT.      XI ALFARO MD    Other Phyisicans  Dr. Sinan Carbone

## 2023-01-25 ENCOUNTER — OFFICE VISIT (OUTPATIENT)
Dept: HEMATOLOGY/ONCOLOGY | Facility: CLINIC | Age: 88
End: 2023-01-25
Payer: MEDICARE

## 2023-01-25 VITALS
DIASTOLIC BLOOD PRESSURE: 71 MMHG | HEART RATE: 89 BPM | RESPIRATION RATE: 18 BRPM | TEMPERATURE: 99 F | BODY MASS INDEX: 19.62 KG/M2 | WEIGHT: 132.5 LBS | SYSTOLIC BLOOD PRESSURE: 103 MMHG | HEIGHT: 69 IN

## 2023-01-25 DIAGNOSIS — D47.1 MYELOPROLIFERATIVE DISORDER: Primary | ICD-10-CM

## 2023-01-25 LAB
BASOPHILS # BLD AUTO: 0.07 X10(3)/MCL (ref 0–0.2)
BASOPHILS NFR BLD AUTO: 0.2 %
EOSINOPHIL # BLD AUTO: 0.02 X10(3)/MCL (ref 0–0.9)
EOSINOPHIL NFR BLD AUTO: 0.1 %
ERYTHROCYTE [DISTWIDTH] IN BLOOD BY AUTOMATED COUNT: 16.5 % (ref 11.5–17)
HCT VFR BLD AUTO: 32.6 % (ref 42–52)
HGB BLD-MCNC: 10.1 GM/DL (ref 14–18)
IMM GRANULOCYTES # BLD AUTO: 0.42 X10(3)/MCL (ref 0–0.04)
IMM GRANULOCYTES NFR BLD AUTO: 1.3 %
LYMPHOCYTES # BLD AUTO: 1.33 X10(3)/MCL (ref 0.6–4.6)
LYMPHOCYTES NFR BLD AUTO: 4 %
MCH RBC QN AUTO: 29.2 PG
MCHC RBC AUTO-ENTMCNC: 31 MG/DL (ref 33–36)
MCV RBC AUTO: 94.2 FL (ref 80–94)
MONOCYTES # BLD AUTO: 2.99 X10(3)/MCL (ref 0.1–1.3)
MONOCYTES NFR BLD AUTO: 9 %
NEUTROPHILS # BLD AUTO: 28.34 X10(3)/MCL (ref 2.1–9.2)
NEUTROPHILS NFR BLD AUTO: 85.4 %
PLATELET # BLD AUTO: 38 X10(3)/MCL (ref 130–400)
PMV BLD AUTO: 11.1 FL (ref 7.4–10.4)
RBC # BLD AUTO: 3.46 X10(6)/MCL (ref 4.7–6.1)
WBC # SPEC AUTO: 33.2 X10(3)/MCL (ref 4.5–11.5)

## 2023-01-25 PROCEDURE — 3288F PR FALLS RISK ASSESSMENT DOCUMENTED: ICD-10-PCS | Mod: CPTII,S$GLB,, | Performed by: INTERNAL MEDICINE

## 2023-01-25 PROCEDURE — 99999 PR PBB SHADOW E&M-EST. PATIENT-LVL IV: CPT | Mod: PBBFAC,,, | Performed by: INTERNAL MEDICINE

## 2023-01-25 PROCEDURE — 1160F PR REVIEW ALL MEDS BY PRESCRIBER/CLIN PHARMACIST DOCUMENTED: ICD-10-PCS | Mod: CPTII,S$GLB,, | Performed by: INTERNAL MEDICINE

## 2023-01-25 PROCEDURE — 1159F MED LIST DOCD IN RCRD: CPT | Mod: CPTII,S$GLB,, | Performed by: INTERNAL MEDICINE

## 2023-01-25 PROCEDURE — 1101F PT FALLS ASSESS-DOCD LE1/YR: CPT | Mod: CPTII,S$GLB,, | Performed by: INTERNAL MEDICINE

## 2023-01-25 PROCEDURE — 99213 OFFICE O/P EST LOW 20 MIN: CPT | Mod: S$GLB,,, | Performed by: INTERNAL MEDICINE

## 2023-01-25 PROCEDURE — 3288F FALL RISK ASSESSMENT DOCD: CPT | Mod: CPTII,S$GLB,, | Performed by: INTERNAL MEDICINE

## 2023-01-25 PROCEDURE — 1126F AMNT PAIN NOTED NONE PRSNT: CPT | Mod: CPTII,S$GLB,, | Performed by: INTERNAL MEDICINE

## 2023-01-25 PROCEDURE — 1160F RVW MEDS BY RX/DR IN RCRD: CPT | Mod: CPTII,S$GLB,, | Performed by: INTERNAL MEDICINE

## 2023-01-25 PROCEDURE — 1159F PR MEDICATION LIST DOCUMENTED IN MEDICAL RECORD: ICD-10-PCS | Mod: CPTII,S$GLB,, | Performed by: INTERNAL MEDICINE

## 2023-01-25 PROCEDURE — 99213 PR OFFICE/OUTPT VISIT, EST, LEVL III, 20-29 MIN: ICD-10-PCS | Mod: S$GLB,,, | Performed by: INTERNAL MEDICINE

## 2023-01-25 PROCEDURE — 1126F PR PAIN SEVERITY QUANTIFIED, NO PAIN PRESENT: ICD-10-PCS | Mod: CPTII,S$GLB,, | Performed by: INTERNAL MEDICINE

## 2023-01-25 PROCEDURE — 1101F PR PT FALLS ASSESS DOC 0-1 FALLS W/OUT INJ PAST YR: ICD-10-PCS | Mod: CPTII,S$GLB,, | Performed by: INTERNAL MEDICINE

## 2023-01-25 PROCEDURE — 99999 PR PBB SHADOW E&M-EST. PATIENT-LVL IV: ICD-10-PCS | Mod: PBBFAC,,, | Performed by: INTERNAL MEDICINE

## 2023-01-25 RX ORDER — SOLIFENACIN SUCCINATE 10 MG/1
10 TABLET, FILM COATED ORAL DAILY
COMMUNITY
Start: 2023-01-17

## 2023-01-25 RX ORDER — ALLOPURINOL 100 MG/1
100 TABLET ORAL DAILY
COMMUNITY
Start: 2023-01-19

## 2023-01-27 DIAGNOSIS — D47.1 MYELOPROLIFERATIVE DISORDER: Primary | ICD-10-CM

## 2023-01-27 RX ORDER — HYDROXYUREA 500 MG/1
CAPSULE ORAL
Qty: 36 CAPSULE | Refills: 2 | Status: SHIPPED | OUTPATIENT
Start: 2023-01-27

## 2023-01-31 ENCOUNTER — TELEPHONE (OUTPATIENT)
Dept: HEMATOLOGY/ONCOLOGY | Facility: CLINIC | Age: 88
End: 2023-01-31
Payer: MEDICARE

## 2023-02-08 ENCOUNTER — TELEPHONE (OUTPATIENT)
Dept: HEMATOLOGY/ONCOLOGY | Facility: CLINIC | Age: 88
End: 2023-02-08
Payer: MEDICARE

## 2023-02-17 NOTE — PROGRESS NOTES
Subjective:       Patient ID: Francis J Colletta is a 89 y.o. male.    Chief Complaint:  Upcoming bladder surgery    Diagnosis:  Myeloproliferative disorder    Current Treatment: Hydroxyurea started 5/06/22 - current dose  500 mg alternating w/1000 mg since 12/20/22    Clinical History:  Patient referred by his PCP for progressive leukocytosis.  Review of previous laboratory showed a WBC count averaging between 15K to 20K dating back to at least 8/19.  Differential showed a mild left shift and no absolute lymphocytosis.  CBC 03/15/22 showed a WBC count of 23.4 with a differential of 70% neutrophils, 14% lymphocytes, 10% monocytes, 2% metas and 2% myelocytes.  Hemoglobin was 14.1, hematocrit 45.7 and platelets 153. Peripheral blood flow cytometry showed no abnormal monoclonal cell populations. Bone marrow aspirate and biopsy 4/19/22 showed a hypercellular marrow with granulocytic hyperplasia and 5.8% myeloblasts.  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  Cytogenetics were normal 46, XY.  FISH for BCR-ABL was negative.  Myeloid molecular panel showed mutations of ASXL1, CBL, EPNZ908, SETBP1, SRSF2 and GATA2.     CT chest 3/21/22 but for follow-up of pulmonary nodules showed stable, small subcentimeter pulmonary nodules and COPD changes.  There is no suspicious lymphadenopathy.    He was seen as a new patient 5/5/22 for a hematology evaluation.  Laboratory testing showed progressive leukocytosis with a white count of 41.4.  There was mild anemia with microcytic, hyperchromic red cell indices, platelet count was normal.  He had no peripheral adenopathy, splenomegaly or B symptoms.  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  He was started on cytoreductive therapy with hydroxyurea 500 mg daily.     His dose has had to be adjusted secondary to thrombocytopenia.  He underwent removal of a penile implant 11/22.  Postoperative course was complicated by  development of a scrotal hematoma requiring evacuation 12/6/22.  Following that procedure, he continued to have intermittent bleeding from the surgical site.  Symptoms persisted despite additional surgical intervention.  He has remained thrombocytopenic on Hydrea.    Interval History  Patient is here for a 4 week follow-up visit accompanied by his son.  At his last office visit, Hydrea was decreased to 500 mg Monday through Friday and 1000 mg Saturdays and Sundays.  Follow-up laboratory showed improvement in his platelet count to 80,000 on 2/8 and 02/13/2023.  WBC increased to 51,000. Patient notes resolution of bleeding from the scrotal incision, and no hematuria.  He is scheduled to undergo TURBT 02/28/2023.  CBC in office today shows worsening thrombocytopenia with a [place] of 35455.  White count is 91018.  Anemia stable.  His weight is stable.  Appetite is gradually improving.  He is not experiencing fevers, abdominal pain or significant sweats.    Review of Systems   Constitutional:  Positive for fatigue. Negative for appetite change, fever and unexpected weight change.   HENT:  Negative for mouth sores, nosebleeds, sore throat and trouble swallowing.    Eyes: Negative.    Respiratory:  Negative for cough and shortness of breath.    Cardiovascular:  Negative for chest pain, palpitations and leg swelling.   Gastrointestinal:  Negative for abdominal distention, abdominal pain, blood in stool, constipation, diarrhea, nausea and vomiting.   Endocrine: Negative.    Genitourinary:  Negative for dysuria, frequency, hematuria, penile pain and urgency.   Musculoskeletal:  Positive for arthralgias and back pain.   Integumentary:  Negative for rash.   Neurological:  Negative for dizziness, weakness, numbness and headaches.   Hematological:  Negative for adenopathy. Bruises/bleeds easily.   Psychiatric/Behavioral: Negative.         PMHx:  CAD, Hyperlipidemia, CVA, osteoarthritis, pneumonia, BPH  PSHx:  Tonsils,  right hip replacement, AAA repair   SH:  Former heavy pipe smoker, quit 2019. Lives in [place] with his wife, retired .  FH:  His daughter had lung cancer.    Objective:     Physical Exam  Vitals reviewed.   Constitutional:       Comments: Thin, elderly white male in no acute distress   HENT:      Head: Normocephalic and atraumatic.      Mouth/Throat:      Mouth: Mucous membranes are moist.      Pharynx: Oropharynx is clear. No posterior oropharyngeal erythema.   Eyes:      Extraocular Movements: Extraocular movements intact.      Conjunctiva/sclera: Conjunctivae normal.      Pupils: Pupils are equal, round, and reactive to light.   Cardiovascular:      Rate and Rhythm: Normal rate and regular rhythm.      Heart sounds: No murmur heard.  Pulmonary:      Comments: Lungs clear to auscultation  Abdominal:      General: Abdomen is flat. Bowel sounds are normal. There is no distension.      Palpations: Abdomen is soft. There is no splenomegaly.      Tenderness: There is no abdominal tenderness.   Musculoskeletal:         General: No swelling or tenderness. Normal range of motion.      Cervical back: Normal range of motion and neck supple. No tenderness.   Lymphadenopathy:      Comments: No palpable cervical, supraclavicular, axillary or inguinal adenopathy   Skin:     General: Skin is warm and dry.      Findings: Bruising (Scattered ecchymoses on arms) present. No rash.   Neurological:      General: No focal deficit present.      Mental Status: He is alert and oriented to person, place, and time.      Cranial Nerves: No cranial nerve deficit.         LABORATORY  Recent Results (from the past 336 hour(s))   CBC with Differential    Collection Time: 02/22/23  2:08 PM   Result Value Ref Range    WBC 42.0 (H) 4.5 - 11.5 x10(3)/mcL    RBC 3.10 (L) 4.70 - 6.10 x10(6)/mcL    Hgb 9.2 (L) 14.0 - 18.0 g/dL    Hct 29.5 (L) 42.0 - 52.0 %    MCV 95.2 (H) 80.0 - 94.0 fL    MCH 29.7 pg    MCHC 31.2 (L) 33.0 - 36.0  g/dL    RDW 18.7 (H) 11.5 - 17.0 %    Platelet 54 (L) 130 - 400 x10(3)/mcL    MPV 12.0 (H) 7.4 - 10.4 fL    Neut % 84.2 %    Lymph % 3.6 %    Mono % 8.5 %    Eos % 0.1 %    Basophil % 0.2 %    Lymph # 1.52 0.6 - 4.6 x10(3)/mcL    Neut # 35.31 (H) 2.1 - 9.2 x10(3)/mcL    Mono # 3.57 (H) 0.1 - 1.3 x10(3)/mcL    Eos # 0.04 0 - 0.9 x10(3)/mcL    Baso # 0.10 0 - 0.2 x10(3)/mcL    IG# 1.43 (H) 0 - 0.04 x10(3)/mcL    IG% 3.4 %         Assessment:   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 Gorman.  Due to upcoming TURBT, patient will hold Hydrea beginning Saturday 2/25/23, and resume at current dose/schedule beginning on postop day 1 as long as he is not having any complications.  Written instructions provided.  Dr. Gorman also put a call out to urology to discuss the case directly.    Return to clinic in 4 weeks with same day CBC.    Patient to call with any bleeding once he resumes the Hydrea.  He is in agreement.  All questions answered.    KIRA STREETER, FNP-C    Other Phyisicans  Dr. Sinan Carbone

## 2023-02-22 ENCOUNTER — OFFICE VISIT (OUTPATIENT)
Dept: HEMATOLOGY/ONCOLOGY | Facility: CLINIC | Age: 88
End: 2023-02-22
Payer: MEDICARE

## 2023-02-22 VITALS
TEMPERATURE: 99 F | BODY MASS INDEX: 19.67 KG/M2 | WEIGHT: 132.81 LBS | SYSTOLIC BLOOD PRESSURE: 103 MMHG | HEIGHT: 69 IN | RESPIRATION RATE: 18 BRPM | HEART RATE: 71 BPM | DIASTOLIC BLOOD PRESSURE: 68 MMHG | OXYGEN SATURATION: 96 %

## 2023-02-22 DIAGNOSIS — D47.1 MYELOPROLIFERATIVE DISORDER: Primary | ICD-10-CM

## 2023-02-22 PROCEDURE — 1160F RVW MEDS BY RX/DR IN RCRD: CPT | Mod: CPTII,S$GLB,, | Performed by: NURSE PRACTITIONER

## 2023-02-22 PROCEDURE — 1101F PR PT FALLS ASSESS DOC 0-1 FALLS W/OUT INJ PAST YR: ICD-10-PCS | Mod: CPTII,S$GLB,, | Performed by: NURSE PRACTITIONER

## 2023-02-22 PROCEDURE — 1126F AMNT PAIN NOTED NONE PRSNT: CPT | Mod: CPTII,S$GLB,, | Performed by: NURSE PRACTITIONER

## 2023-02-22 PROCEDURE — 1159F MED LIST DOCD IN RCRD: CPT | Mod: CPTII,S$GLB,, | Performed by: NURSE PRACTITIONER

## 2023-02-22 PROCEDURE — 3288F FALL RISK ASSESSMENT DOCD: CPT | Mod: CPTII,S$GLB,, | Performed by: NURSE PRACTITIONER

## 2023-02-22 PROCEDURE — 3288F PR FALLS RISK ASSESSMENT DOCUMENTED: ICD-10-PCS | Mod: CPTII,S$GLB,, | Performed by: NURSE PRACTITIONER

## 2023-02-22 PROCEDURE — 1126F PR PAIN SEVERITY QUANTIFIED, NO PAIN PRESENT: ICD-10-PCS | Mod: CPTII,S$GLB,, | Performed by: NURSE PRACTITIONER

## 2023-02-22 PROCEDURE — 1159F PR MEDICATION LIST DOCUMENTED IN MEDICAL RECORD: ICD-10-PCS | Mod: CPTII,S$GLB,, | Performed by: NURSE PRACTITIONER

## 2023-02-22 PROCEDURE — 99999 PR PBB SHADOW E&M-EST. PATIENT-LVL IV: ICD-10-PCS | Mod: PBBFAC,,, | Performed by: NURSE PRACTITIONER

## 2023-02-22 PROCEDURE — 99214 PR OFFICE/OUTPT VISIT, EST, LEVL IV, 30-39 MIN: ICD-10-PCS | Mod: S$GLB,,, | Performed by: NURSE PRACTITIONER

## 2023-02-22 PROCEDURE — 1101F PT FALLS ASSESS-DOCD LE1/YR: CPT | Mod: CPTII,S$GLB,, | Performed by: NURSE PRACTITIONER

## 2023-02-22 PROCEDURE — 1160F PR REVIEW ALL MEDS BY PRESCRIBER/CLIN PHARMACIST DOCUMENTED: ICD-10-PCS | Mod: CPTII,S$GLB,, | Performed by: NURSE PRACTITIONER

## 2023-02-22 PROCEDURE — 99214 OFFICE O/P EST MOD 30 MIN: CPT | Mod: S$GLB,,, | Performed by: NURSE PRACTITIONER

## 2023-02-22 PROCEDURE — 99999 PR PBB SHADOW E&M-EST. PATIENT-LVL IV: CPT | Mod: PBBFAC,,, | Performed by: NURSE PRACTITIONER

## 2023-02-27 NOTE — PRE-PROCEDURE INSTRUCTIONS
[facility]: Outpatient Surgery  Preprocedure Check-In Instructions    Your arrival time for your surgery or procedure is 10:00am.  We ask patients to arrive about 2 hours before surgery to allow for enough time to review your health history & medications, start your IV, complete any outstanding labwork or tests, and meet your Anesthesiologist.  You will arrive at [facility], [address] [place] [address].  Enter through the [place] entrance next to the Emergency Room, and come to the 6th floor to the Outpatient Surgery Department.    Visitory Policy:  You are allowed 2 adult visitors to be with you in the hospital.  Any additional visitors may switch places at the hospital entrance (not in the waiting rooms).  All hospital visitors should be in good current health.  No small children.    What to Bring:  Please have your ID, insurance cards, and all home medication bottles with you at check in.  Bring your CPAP machine if one is used at home.    Fasting:  Nothing to eat or drink after midnight the night before your procedure. This includes no ice, gum, hard candies, and/or tobacco products.  Follow your doctor's instructions for taking any medications on the morning of your procedure.  If no instructions for taking medications were given, do not take any medications but bring your medications in their bottles to your procedure check in.    Follow your doctor's preoperative instructions regarding skin prep, bowel prep, bathing, or showering prior to your procedure.  If any special soaps were provided to you, please use according to your doctor's instructions. If no instructions were given from your doctor, take a good bath or shower with antibacterial soap the night before and the morning of your procedure.  On the morning of procedure, wear loose, comfortable clothing.  No lotions, makeup, perfumes, colognes, deodorant, or jewelry to your procedure.  Removable items (glasses,  contact lenses, dentures, retainers, hearing aids) need to be removed for your procedure.  Bring your storage containers for these items if you wear them.    Artificial nails, body jewelry, eyelash extensions, and/or hair extensions with metal clips are not allowed during your surgery.  If you currently wear any of these items, please arrange for them to be removed prior to your arrival to the hospital.    Outpatient or Same Day Surgeries:  Any patients receiving sedation/anesthesia are advised not to drive for 24 hours after their procedure.  We do not allow patients to drive themselves home after discharge.  If you are going home after your procedure, please have someone available to drive you home from the hospital.      You may call the Outpatient Surgery Department at [phone] with any questions or concerns.  We are looking forward to meeting you and taking great care of you for your procedure.  Thank you for choosing [facility] for your surgical needs.        Status: complete  Spoke with: pt & spouse  Time: 1108

## 2023-02-28 ENCOUNTER — HOSPITAL ENCOUNTER (INPATIENT)
Facility: HOSPITAL | Age: 88
LOS: 3 days | Discharge: HOME HEALTH | DRG: 669 | End: 2023-03-03
Attending: UROLOGY | Admitting: UROLOGY
Payer: MEDICARE

## 2023-02-28 ENCOUNTER — ANESTHESIA EVENT (OUTPATIENT)
Dept: SURGERY | Facility: HOSPITAL | Age: 88
DRG: 669 | End: 2023-02-28
Payer: MEDICARE

## 2023-02-28 ENCOUNTER — ANESTHESIA (OUTPATIENT)
Dept: SURGERY | Facility: HOSPITAL | Age: 88
DRG: 669 | End: 2023-02-28
Payer: MEDICARE

## 2023-02-28 DIAGNOSIS — D49.4 BLADDER TUMOR: Primary | ICD-10-CM

## 2023-02-28 DIAGNOSIS — C67.2 MALIGNANT NEOPLASM OF LATERAL WALL OF URINARY BLADDER: ICD-10-CM

## 2023-02-28 DIAGNOSIS — R31.0 GROSS HEMATURIA: ICD-10-CM

## 2023-02-28 DIAGNOSIS — D47.1 MYELOPROLIFERATIVE DISORDER: ICD-10-CM

## 2023-02-28 LAB
GROUP & RH: NORMAL
INDIRECT COOMBS GEL: NORMAL

## 2023-02-28 PROCEDURE — 63600175 PHARM REV CODE 636 W HCPCS: Performed by: UROLOGY

## 2023-02-28 PROCEDURE — 63600175 PHARM REV CODE 636 W HCPCS: Performed by: NURSE ANESTHETIST, CERTIFIED REGISTERED

## 2023-02-28 PROCEDURE — 86923 COMPATIBILITY TEST ELECTRIC: CPT | Performed by: UROLOGY

## 2023-02-28 PROCEDURE — 86923 COMPATIBILITY TEST ELECTRIC: CPT | Mod: 91 | Performed by: STUDENT IN AN ORGANIZED HEALTH CARE EDUCATION/TRAINING PROGRAM

## 2023-02-28 PROCEDURE — 36000706: Performed by: UROLOGY

## 2023-02-28 PROCEDURE — 25000003 PHARM REV CODE 250: Performed by: UROLOGY

## 2023-02-28 PROCEDURE — 27201423 OPTIME MED/SURG SUP & DEVICES STERILE SUPPLY: Performed by: UROLOGY

## 2023-02-28 PROCEDURE — 25000003 PHARM REV CODE 250: Performed by: NURSE ANESTHETIST, CERTIFIED REGISTERED

## 2023-02-28 PROCEDURE — 86900 BLOOD TYPING SEROLOGIC ABO: CPT | Performed by: UROLOGY

## 2023-02-28 PROCEDURE — 71000033 HC RECOVERY, INTIAL HOUR: Performed by: UROLOGY

## 2023-02-28 PROCEDURE — 88307 TISSUE EXAM BY PATHOLOGIST: CPT | Performed by: UROLOGY

## 2023-02-28 PROCEDURE — 37000008 HC ANESTHESIA 1ST 15 MINUTES: Performed by: UROLOGY

## 2023-02-28 PROCEDURE — 37000009 HC ANESTHESIA EA ADD 15 MINS: Performed by: UROLOGY

## 2023-02-28 PROCEDURE — 11000001 HC ACUTE MED/SURG PRIVATE ROOM

## 2023-02-28 PROCEDURE — 36000707: Performed by: UROLOGY

## 2023-02-28 RX ORDER — SODIUM CHLORIDE 450 MG/100ML
INJECTION, SOLUTION INTRAVENOUS CONTINUOUS
Status: DISCONTINUED | OUTPATIENT
Start: 2023-02-28 | End: 2023-03-03 | Stop reason: HOSPADM

## 2023-02-28 RX ORDER — DOCUSATE SODIUM 100 MG/1
100 CAPSULE, LIQUID FILLED ORAL 2 TIMES DAILY
Status: DISCONTINUED | OUTPATIENT
Start: 2023-02-28 | End: 2023-03-03 | Stop reason: HOSPADM

## 2023-02-28 RX ORDER — GLYCOPYRROLATE 0.2 MG/ML
INJECTION INTRAMUSCULAR; INTRAVENOUS
Status: DISCONTINUED | OUTPATIENT
Start: 2023-02-28 | End: 2023-02-28

## 2023-02-28 RX ORDER — HYOSCYAMINE SULFATE 0.12 MG/1
0.12 TABLET SUBLINGUAL EVERY 4 HOURS PRN
Status: DISCONTINUED | OUTPATIENT
Start: 2023-02-28 | End: 2023-03-03 | Stop reason: HOSPADM

## 2023-02-28 RX ORDER — ONDANSETRON 2 MG/ML
INJECTION INTRAMUSCULAR; INTRAVENOUS
Status: DISCONTINUED | OUTPATIENT
Start: 2023-02-28 | End: 2023-02-28

## 2023-02-28 RX ORDER — FENTANYL CITRATE 50 UG/ML
INJECTION, SOLUTION INTRAMUSCULAR; INTRAVENOUS
Status: DISCONTINUED | OUTPATIENT
Start: 2023-02-28 | End: 2023-02-28

## 2023-02-28 RX ORDER — ATORVASTATIN CALCIUM 10 MG/1
20 TABLET, FILM COATED ORAL DAILY
Status: DISCONTINUED | OUTPATIENT
Start: 2023-03-01 | End: 2023-03-03 | Stop reason: HOSPADM

## 2023-02-28 RX ORDER — LIDOCAINE HYDROCHLORIDE 20 MG/ML
INJECTION, SOLUTION EPIDURAL; INFILTRATION; INTRACAUDAL; PERINEURAL
Status: DISCONTINUED | OUTPATIENT
Start: 2023-02-28 | End: 2023-02-28

## 2023-02-28 RX ORDER — DEXAMETHASONE SODIUM PHOSPHATE 4 MG/ML
INJECTION, SOLUTION INTRA-ARTICULAR; INTRALESIONAL; INTRAMUSCULAR; INTRAVENOUS; SOFT TISSUE
Status: DISCONTINUED | OUTPATIENT
Start: 2023-02-28 | End: 2023-02-28

## 2023-02-28 RX ORDER — CEFTRIAXONE 1 G/1
INJECTION, POWDER, FOR SOLUTION INTRAMUSCULAR; INTRAVENOUS
Status: DISPENSED
Start: 2023-02-28 | End: 2023-03-01

## 2023-02-28 RX ORDER — PROPOFOL 10 MG/ML
VIAL (ML) INTRAVENOUS
Status: DISCONTINUED | OUTPATIENT
Start: 2023-02-28 | End: 2023-02-28

## 2023-02-28 RX ORDER — TAMSULOSIN HYDROCHLORIDE 0.4 MG/1
1 CAPSULE ORAL DAILY
Status: DISCONTINUED | OUTPATIENT
Start: 2023-03-01 | End: 2023-03-03 | Stop reason: HOSPADM

## 2023-02-28 RX ORDER — HYDROCODONE BITARTRATE AND ACETAMINOPHEN 7.5; 325 MG/1; MG/1
1 TABLET ORAL EVERY 6 HOURS PRN
Status: DISCONTINUED | OUTPATIENT
Start: 2023-02-28 | End: 2023-03-03 | Stop reason: HOSPADM

## 2023-02-28 RX ORDER — ALLOPURINOL 100 MG/1
100 TABLET ORAL DAILY
Status: DISCONTINUED | OUTPATIENT
Start: 2023-03-01 | End: 2023-03-03 | Stop reason: HOSPADM

## 2023-02-28 RX ORDER — CEFAZOLIN SODIUM 2 G/50ML
2 SOLUTION INTRAVENOUS
Status: COMPLETED | OUTPATIENT
Start: 2023-02-28 | End: 2023-03-01

## 2023-02-28 RX ADMIN — SODIUM CHLORIDE: 4.5 INJECTION, SOLUTION INTRAVENOUS at 10:02

## 2023-02-28 RX ADMIN — DOCUSATE SODIUM 100 MG: 100 CAPSULE, LIQUID FILLED ORAL at 10:02

## 2023-02-28 RX ADMIN — DEXAMETHASONE SODIUM PHOSPHATE 4 MG: 4 INJECTION, SOLUTION INTRA-ARTICULAR; INTRALESIONAL; INTRAMUSCULAR; INTRAVENOUS; SOFT TISSUE at 06:02

## 2023-02-28 RX ADMIN — GLYCOPYRROLATE 0.2 MG: 0.2 INJECTION INTRAMUSCULAR; INTRAVENOUS at 06:02

## 2023-02-28 RX ADMIN — SODIUM CHLORIDE, SODIUM GLUCONATE, SODIUM ACETATE, POTASSIUM CHLORIDE AND MAGNESIUM CHLORIDE: 526; 502; 368; 37; 30 INJECTION, SOLUTION INTRAVENOUS at 06:02

## 2023-02-28 RX ADMIN — CEFAZOLIN SODIUM 2 G: 2 SOLUTION INTRAVENOUS at 10:02

## 2023-02-28 RX ADMIN — CEFTRIAXONE 1 G: 1 INJECTION, SOLUTION INTRAVENOUS at 07:02

## 2023-02-28 RX ADMIN — ONDANSETRON 4 MG: 2 INJECTION INTRAMUSCULAR; INTRAVENOUS at 06:02

## 2023-02-28 RX ADMIN — LIDOCAINE HYDROCHLORIDE 40 MG: 20 INJECTION, SOLUTION EPIDURAL; INFILTRATION; INTRACAUDAL; PERINEURAL at 06:02

## 2023-02-28 RX ADMIN — FENTANYL CITRATE 100 MCG: 50 INJECTION, SOLUTION INTRAMUSCULAR; INTRAVENOUS at 07:02

## 2023-02-28 RX ADMIN — PROPOFOL 150 MG: 10 INJECTION, EMULSION INTRAVENOUS at 06:02

## 2023-02-28 NOTE — ANESTHESIA PREPROCEDURE EVALUATION
02/28/2023  Francis J Colletta is a 89 y.o., male.  TURBT (TRANSURETHRAL RESECTION OF BLADDER TUMOR      Pre-op Assessment    I have reviewed the Patient Summary Reports.     I have reviewed the Nursing Notes. I have reviewed the NPO Status.   I have reviewed the Medications.     Review of Systems  Anesthesia Hx:  No problems with previous Anesthesia    Hematology/Oncology:  Hematology Normal   Oncology Normal     EENT/Dental:EENT/Dental Normal   Cardiovascular:  Cardiovascular Normal Exercise tolerance: good CAD     Functional Capacity good / => 4 METS    Pulmonary:  Pulmonary Normal    Renal/:   Denies Chronic Renal Disease.     Hepatic/GI:  Hepatic/GI Normal    Musculoskeletal:   Arthritis     Neurological:   CVA    Endocrine:  Endocrine Normal  Denies Morbid Obesity / BMI > 40  Dermatological:  Skin Normal    Psych:  Psychiatric Normal           Physical Exam  General: Alert, Oriented, Well nourished and Cooperative    Airway:  Mallampati: II   Mouth Opening: Normal  TM Distance: Normal  Tongue: Normal  Neck ROM: Normal ROM    Dental:  Intact    Chest/Lungs:  Clear to auscultation, Normal Respiratory Rate    Heart:  Rate: Normal  Rhythm: Regular Rhythm       Latest Reference Range & Units Most Recent   Sodium 136 - 145 mmol/L 138  1/25/23 09:37   Potassium 3.5 - 5.1 mmol/L 4.9  1/25/23 09:37   Chloride 98 - 107 mmol/L 103  1/25/23 09:37   CO2 23 - 31 mmol/L 28  1/25/23 09:37   Anion Gap 8 - 16 mmol/L 3 (L)  8/1/18 04:57   BUN 8.4 - 25.7 mg/dL 38.7 (H)  1/25/23 09:37   Creatinine 0.73 - 1.18 mg/dL 1.89 (H)  1/25/23 09:37   eGFR mls/min/1.73/m2 34  1/25/23 09:37   eGFR if non African American mls/min/1.73/m2 45  5/31/22 13:44   eGFR if African American >60 mL/min/1.73 m^2 >60  8/1/18 04:57   Glucose 82 - 115 mg/dL 112  1/25/23 09:37   Calcium 8.8 - 10.0 mg/dL 9.0  1/25/23 09:37   Phosphorus 2.7 - 4.5  mg/dL 3.7  8/1/18 04:57   Magnesium 1.6 - 2.6 mg/dL 2.3  8/1/18 04:57   Alkaline Phosphatase 40 - 150 unit/L 330 (H)  1/25/23 09:37   PROTEIN TOTAL 5.8 - 7.6 gm/dL 6.1  1/25/23 09:37   Albumin 3.4 - 4.8 g/dL 3.3 (L)  1/25/23 09:37   Albumin/Globulin Ratio 1.1 - 2.0 ratio 1.2  1/25/23 09:37   BILIRUBIN TOTAL <=1.5 mg/dL 0.6  1/25/23 09:37   AST 5 - 34 unit/L 16  1/25/23 09:37   ALT 0 - 55 unit/L 6  1/25/23 09:37   Globulin, Total 2.4 - 3.5 gm/dL 2.8  1/25/23 09:37   Cholesterol 120 - 199 mg/dL 140  8/1/18 04:57   HDL 40 - 75 mg/dL 41  8/1/18 04:57   HDL/Cholesterol Ratio 20.0 - 50.0 % 29.3  8/1/18 04:57   LDL Cholesterol External 63.0 - 159.0 mg/dL 80.6  8/1/18 04:57   Non-HDL Cholesterol mg/dL 99  8/1/18 04:57   Total Cholesterol/HDL Ratio 2.0 - 5.0  3.4  8/1/18 04:57   Triglycerides 30 - 150 mg/dL 92  8/1/18 04:57    - 220 U/L 296 (H)  1/25/23 09:37   Hemoglobin A1C External 4.0 - 5.6 % 5.6  8/1/18 04:57   Estimated Avg Glucose 68 - 131 mg/dL 114  8/1/18 04:57   TSH 0.400 - 4.000 uIU/mL 1.057  8/1/18 04:57   (L): Data is abnormally low  (H): Data is abnormally high   Latest Reference Range & Units Most Recent   WBC 4.5 - 11.5 x10(3)/mcL 42.0 (H)  2/22/23 14:08   RBC 4.70 - 6.10 x10(6)/mcL 3.10 (L)  2/22/23 14:08   Hemoglobin 14.0 - 18.0 g/dL 9.2 (L)  2/22/23 14:08   Hematocrit 42.0 - 52.0 % 29.5 (L)  2/22/23 14:08   MCV 80.0 - 94.0 fL 95.2 (H)  2/22/23 14:08   MCH pg 29.7  2/22/23 14:08   MCHC 33.0 - 36.0 g/dL 31.2 (L)  2/22/23 14:08   RDW 11.5 - 17.0 % 18.7 (H)  2/22/23 14:08   Platelets 130 - 400 x10(3)/mcL 54 (L)  2/22/23 14:08   MPV 7.4 - 10.4 fL 12.0 (H)  2/22/23 14:08   Platelet Estimate Normal, Adequate  Adequate  12/5/22 13:42   Gran % 38.0 - 73.0 % 57.6  8/1/18 04:57   Neutrophils Relative 47 - 80 % 78  12/5/22 13:42   Neut % % 84.2  2/22/23 14:08   Lymph % 18.0 - 48.0 % 28.1  8/1/18 04:57   LYMPH % % 3.6  2/22/23 14:08   Lymph Man 13 - 40 % 3 (L)  12/5/22 13:42   Mono % % 8.5  2/22/23 14:08    Eosinophil % % 0.1  2/22/23 14:08   Basophil % % 0.2  2/22/23 14:08   Immature Granulocytes % 3.4  2/22/23 14:08   Gran # (ANC) 1.8 - 7.7 K/uL 4.2  8/1/18 04:57   Abs Neut calc 2.1 - 9.2 x10(3)/mcL 60.164 (H)  12/5/22 13:42   Neut # 2.1 - 9.2 x10(3)/mcL 35.31 (H)  2/22/23 14:08   Lymph # 0.6 - 4.6 x10(3)/mcL 1.52  2/22/23 14:08   Mono # 0.1 - 1.3 x10(3)/mcL 3.57 (H)  2/22/23 14:08   Eos # 0 - 0.9 x10(3)/mcL 0.04  2/22/23 14:08   Baso # 0 - 0.2 x10(3)/mcL 0.10  2/22/23 14:08   Immature Grans (Abs) 0 - 0.04 x10(3)/mcL 1.43 (H)  2/22/23 14:08   Bands 0 - 11 % 8  12/5/22 13:42   Metamyelocytes % 2  12/5/22 13:42   Myelocytes % 1  12/5/22 13:42   NRBC Man % 1  12/5/22 13:42   Differential Method  Automated  8/1/18 04:57   Aniso (none)  Slight !  12/5/22 13:42   RBC Morph Normal  Abnormal !  12/5/22 13:42   Toxic Granulation (none)  Slight !  11/4/22 09:18   Peripheral Smear Evaluation  The white blood cell count is markedly elevated. The granulocytes show a moderate left shift in maturation. A few basophils are identified. Occasional blasts are present. The monocytes are unremarkable. The lymphocytes and platelets are unremarkable. 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 Recommend testing for bcr/abl and mutations associated with MPD, if not performed concurrently or in the past.    Dr. Walt Hernandez  12/6/22 08:34   Iron 65 - 175 ug/dL 75  5/18/22 09:29   TIBC 250 - 450 ug/dL 222 (L)  5/18/22 09:29   Iron Binding Capacity Unsaturated 69 - 240 ug/dL 147  5/18/22 09:29   Transferrin mg/dL 198  5/18/22 09:29   Ferritin 21.81 - 274.66 ng/mL 183.35  5/18/22 09:29   Iron Saturation 20 - 50 % 34  5/18/22 09:29   Protime 9.0 - 12.5 sec 10.4  8/1/18 04:57   INR 0.8 - 1.2  1.0  8/1/18 04:57   aPTT 21.0 - 32.0 sec 28.0  8/1/18 04:57   (H): Data is abnormally high  (L): Data is abnormally low  !: Data is abnormal     P-R Int : 148 ms          QRS Dur : 080 ms        QT Int : 376 ms       P-R-T Axes : 044 072 037 degrees      QTc Int : 402 ms     Normal sinus rhythm   Normal ECG   No previous ECGs available   Confirmed by Krsitine Quiñones MD ([id number]) on 10/19/2022 8:54:39 PM     Referred By: CASSIA CAREY           Confirmed By:Kristine Quiñones MD      Specimen Collected: 10/19/22 15:36            Anesthesia Plan  Type of Anesthesia, risks & benefits discussed:    Anesthesia Type: Gen Supraglottic Airway  Intra-op Monitoring Plan: Standard ASA Monitors  Post Op Pain Control Plan: multimodal analgesia  Induction:  IV and Inhalation  Airway Plan: Direct  Informed Consent: Informed consent signed with the Patient and all parties understand the risks and agree with anesthesia plan.  All questions answered. Patient consented to blood products? Yes  ASA Score: 4  Day of Surgery Review of History & Physical: H&P Update referred to the surgeon/provider.    Ready For Surgery From Anesthesia Perspective.     Aorta: The aortic root is normal in size, measuring 3.4 cm at sinotubular junction.     Left Atrium: The left atrial volume index is normal, measuring 10.99 cc/m2.     Left Ventricle: The left ventricle is normal in size, with an end-diastolic diameter of 3.8 cm, and an end-systolic diameter of 2.8 cm. LV wall thickness is normal, with the septum measuring 0.9 cm and the posterior wall measuring 1.0 cm across. Relative    wall thickness was increased at 0.53, and the LV mass index was 65.9 g/m2 consistent with concentric remodeling. There are no regional wall motion abnormalities. Left ventricular systolic function appears normal. Visually estimated ejection fraction is   60-65%. The LV Doppler derived stroke volume equals 77.0 ccs.     Diastolic indices: E wave velocity 0.5 m/s, E/A ratio 0.5,  msec., E/e' ratio(lat) 7. Mean left atrial pressures are normal. There is diastolic dysfunction secondary to relaxation abnormality.     Right Atrium: The right atrium is normal in size,  measuring 3.4 cm in length in the apical view.     Right Ventricle: The right ventricle is normal in size measuring 2.1 cm at the base in the apical right ventricle-focused view. Global right ventricular systolic function appears normal. The estimated PA systolic pressure is 21 mmHg.     Aortic Valve:  The aortic valve is normal in structure with normal leaflet mobility. The aortic valve is tri-leaflet in structure.     Mitral Valve:  The mitral valve is normal in structure with normal leaflet mobility.     Tricuspid Valve:  The tricuspid valve is normal in structure with normal leaflet mobility. There is trivial tricuspid regurgitation.     Pulmonary Valve:  The pulmonic valve is not well seen.     IVC: IVC is normal in size and collapses > 50% with a sniff, suggesting normal right atrial pressure of 3 mmHg.     Atrial Septum: The atrial septum is intact.     Intracavitary: There is no evidence of pericardial effusion, intracavity mass, thrombi, or vegetation.      .

## 2023-03-01 LAB
ABORH RETYPE: NORMAL
ABS NEUT (OLG): 108.78 X10(3)/MCL (ref 2.1–9.2)
ANISOCYTOSIS BLD QL SMEAR: ABNORMAL
ERYTHROCYTE [DISTWIDTH] IN BLOOD BY AUTOMATED COUNT: 20.2 % (ref 11.5–17)
HCT VFR BLD AUTO: 21.9 % (ref 42–52)
HCT VFR BLD AUTO: 26 % (ref 42–52)
HCT VFR BLD AUTO: 26.8 % (ref 42–52)
HGB BLD-MCNC: 7.2 G/DL (ref 14–18)
HGB BLD-MCNC: 8.5 G/DL (ref 14–18)
HGB BLD-MCNC: 8.7 G/DL (ref 14–18)
IMM GRANULOCYTES # BLD AUTO: 6.97 X10(3)/MCL (ref 0–0.04)
IMM GRANULOCYTES NFR BLD AUTO: 6.3 %
INSTRUMENT WBC (OLG): 111 X10(3)/MCL
LYMPHOCYTES NFR BLD MANUAL: 1 %
LYMPHOCYTES NFR BLD MANUAL: 1.11 X10(3)/MCL
MACROCYTES BLD QL SMEAR: ABNORMAL
MCH RBC QN AUTO: 30.3 PG
MCHC RBC AUTO-ENTMCNC: 32.5 G/DL (ref 33–36)
MCV RBC AUTO: 93.4 FL (ref 80–94)
METAMYELOCYTES NFR BLD MANUAL: 1 %
MONOCYTES NFR BLD MANUAL: 2 %
MONOCYTES NFR BLD MANUAL: 2.22 X10(3)/MCL (ref 0.1–1.3)
NEUTROPHILS NFR BLD MANUAL: 97 %
NRBC BLD AUTO-RTO: 0 %
PLATELET # BLD AUTO: 75 X10(3)/MCL (ref 130–400)
PLATELET # BLD EST: ABNORMAL 10*3/UL
PMV BLD AUTO: 12 FL (ref 7.4–10.4)
POIKILOCYTOSIS BLD QL SMEAR: ABNORMAL
POLYCHROMASIA BLD QL SMEAR: ABNORMAL
RBC # BLD AUTO: 2.87 X10(6)/MCL (ref 4.7–6.1)
RBC MORPH BLD: ABNORMAL
TARGETS BLD QL SMEAR: ABNORMAL
WBC # SPEC AUTO: 111.3 X10(3)/MCL (ref 4.5–11.5)

## 2023-03-01 PROCEDURE — 85014 HEMATOCRIT: CPT | Performed by: UROLOGY

## 2023-03-01 PROCEDURE — 11000001 HC ACUTE MED/SURG PRIVATE ROOM

## 2023-03-01 PROCEDURE — 36430 TRANSFUSION BLD/BLD COMPNT: CPT

## 2023-03-01 PROCEDURE — P9016 RBC LEUKOCYTES REDUCED: HCPCS | Performed by: UROLOGY

## 2023-03-01 PROCEDURE — 85018 HEMOGLOBIN: CPT | Performed by: NURSE PRACTITIONER

## 2023-03-01 PROCEDURE — 63600175 PHARM REV CODE 636 W HCPCS: Performed by: UROLOGY

## 2023-03-01 PROCEDURE — 25000003 PHARM REV CODE 250: Performed by: UROLOGY

## 2023-03-01 PROCEDURE — 85027 COMPLETE CBC AUTOMATED: CPT | Performed by: UROLOGY

## 2023-03-01 RX ORDER — HYDROCODONE BITARTRATE AND ACETAMINOPHEN 500; 5 MG/1; MG/1
TABLET ORAL
Status: DISCONTINUED | OUTPATIENT
Start: 2023-03-01 | End: 2023-03-03 | Stop reason: HOSPADM

## 2023-03-01 RX ADMIN — CEFAZOLIN SODIUM 2 G: 2 SOLUTION INTRAVENOUS at 05:03

## 2023-03-01 RX ADMIN — TAMSULOSIN HYDROCHLORIDE 0.4 MG: 0.4 CAPSULE ORAL at 09:03

## 2023-03-01 RX ADMIN — ATORVASTATIN CALCIUM 20 MG: 10 TABLET, FILM COATED ORAL at 09:03

## 2023-03-01 RX ADMIN — CEFAZOLIN SODIUM 2 G: 2 SOLUTION INTRAVENOUS at 12:03

## 2023-03-01 RX ADMIN — SODIUM CHLORIDE: 4.5 INJECTION, SOLUTION INTRAVENOUS at 06:03

## 2023-03-01 RX ADMIN — DOCUSATE SODIUM 100 MG: 100 CAPSULE, LIQUID FILLED ORAL at 08:03

## 2023-03-01 RX ADMIN — DOCUSATE SODIUM 100 MG: 100 CAPSULE, LIQUID FILLED ORAL at 09:03

## 2023-03-01 RX ADMIN — ALLOPURINOL 100 MG: 100 TABLET ORAL at 04:03

## 2023-03-01 NOTE — ANESTHESIA PROCEDURE NOTES
Intubation    Date/Time: 2/28/2023 7:00 PM  Performed by: Alexis Conroy CRNA  Authorized by: Tom Spicer MD     Intubation:     Induction:  Intravenous    Mask Ventilation:  Easy mask    Attempts:  1    Attempted By:  CRNA    Difficult Airway Encountered?: No      Complications:  None    Airway Device:  Supraglottic airway/LMA    Airway Device Size:  3.0    Style/Cuff Inflation:  Cuffed    Placement Verified By:  Capnometry    Complicating Factors:  None    Findings Post-Intubation:  BS equal bilateral

## 2023-03-01 NOTE — ANESTHESIA POSTPROCEDURE EVALUATION
Anesthesia Post Evaluation    Patient: Francis J Colletta    Procedure(s) Performed: Procedure(s) (LRB):  TURBT (TRANSURETHRAL RESECTION OF BLADDER TUMOR) (N/A)    Final Anesthesia Type: general      Patient location during evaluation: PACU  Patient participation: Yes- Able to Participate  Level of consciousness: awake and alert  Post-procedure vital signs: reviewed and stable  Pain management: adequate  Airway patency: patent  NEHA mitigation strategies: Multimodal analgesia  PONV status at discharge: No PONV  Anesthetic complications: no      Cardiovascular status: blood pressure returned to baseline and hemodynamically stable  Respiratory status: unassisted  Hydration status: euvolemic  Follow-up not needed.          Vitals Value Taken Time   /61 02/28/23 2012   Temp 36.6 °C (97.9 °F) 02/28/23 1945   Pulse 67 02/28/23 2016   Resp 15 02/28/23 2016   SpO2 96 % 02/28/23 2016   Vitals shown include unvalidated device data.      No case tracking events are documented in the log.      Pain/Amisha Score: Amisha Score: 9 (2/28/2023  8:00 PM)

## 2023-03-01 NOTE — TRANSFER OF CARE
"Anesthesia Transfer of Care Note    Patient: Francis J Colletta    Procedure(s) Performed: Procedure(s) (LRB):  TURBT (TRANSURETHRAL RESECTION OF BLADDER TUMOR) (N/A)    Patient location: PACU    Anesthesia Type: general    Transport from OR: Transported from OR on room air with adequate spontaneous ventilation    Post pain: adequate analgesia    Post assessment: no apparent anesthetic complications    Post vital signs: stable    Level of consciousness: sedated    Complications: none    Transfer of care protocol was followed      Last vitals:   Visit Vitals  /66 (BP Location: Right arm, Patient Position: Lying)   Pulse 67   Temp 36.9 °C (98.4 °F)   Resp 18   Ht 5' 9" (1.753 m)   Wt 58.4 kg (128 lb 12 oz)   SpO2 95%   BMI 19.01 kg/m²     "

## 2023-03-01 NOTE — PROGRESS NOTES
UROLOGY  PROGRESS  NOTE    Francis J Colletta 11/4/1933  [id number]  3/1/2023    Subjective:  Nursing reports urine got bloodier earlier this morning.  After surgery it was light pink.  He has no complaints this morning      Objective:  Wt Readings from Last 3 Encounters:   02/28/23 58.4 kg (128 lb 12 oz)   02/22/23 60.2 kg (132 lb 12.8 oz)   01/25/23 60.1 kg (132 lb 7.9 oz)     Temp Readings from Last 3 Encounters:   03/01/23 98.2 °F (36.8 °C) (Oral)   02/22/23 98.5 °F (36.9 °C)   01/25/23 98.5 °F (36.9 °C) (Oral)     BP Readings from Last 3 Encounters:   03/01/23 102/61   02/22/23 103/68   01/25/23 103/71     Pulse Readings from Last 3 Encounters:   03/01/23 77   02/22/23 71   01/25/23 89       Intake/Output:  No intake/output data recorded.  I/O last 3 completed shifts:  In: 1240 [P.O.:240; IV Piggyback:1000]  Out: 14932 [Urine:00841]       Exam:    NCAT  Card RRR  Resp unlabored  Abd soft, nontender nondistended   three-way Parker catheter in place draining bright red blood  Extremity no C/C/E      Recent Results (from the past 24 hour(s))   Type & Screen    Collection Time: 02/28/23 10:24 AM   Result Value Ref Range    Group & Rh B POS     Indirect Ele GEL NEG    CBC with Differential    Collection Time: 03/01/23  6:43 AM   Result Value Ref Range    .3 (HH) 4.5 - 11.5 x10(3)/mcL    RBC 2.87 (L) 4.70 - 6.10 x10(6)/mcL    Hgb 8.7 (L) 14.0 - 18.0 g/dL    Hct 26.8 (L) 42.0 - 52.0 %    MCV 93.4 80.0 - 94.0 fL    MCH 30.3 pg    MCHC 32.5 (L) 33.0 - 36.0 g/dL    RDW 20.2 (H) 11.5 - 17.0 %    Platelet 75 (L) 130 - 400 x10(3)/mcL    MPV 12.0 (H) 7.4 - 10.4 fL    IG# 6.97 (H) 0 - 0.04 x10(3)/mcL    IG% 6.3 %    NRBC% 0.0 %             Assessment:   bladder tumor-status post Transurethral resection.  Urine got bloody this morning.  He continues on bladder irrigation.  Hemoglobin 8.7      Plan:  We will check a hemoglobin and hematocrit of 12:00 p.m. he may need a blood transfusion.  Continue his bladder  irrigation    Moisés Carbone MD

## 2023-03-01 NOTE — OP NOTE
Francis J Colletta   11/4/1933   [id number]     Date of Procedure: 2/28/2023              PreOP Dx:  Bladder tumor    Post Op Dx:  Bladder tumor      Procedure:  Procedure(s):  TURBT (TRANSURETHRAL RESECTION OF BLADDER TUMOR) (2-5 cm)      Surgeon:  Moisés Carbone MD      EBL: 10cc      Procedure:       After informed consent was obtained, the patient was taken to the operating room after receiving a preoperative dose of antibiotics.  He was given a general anesthetic and placed in a dorsal lithotomy position his genitals were prepped and draped in usual sterile fashion.  I placed a 26 [place] continuous-flow bipolar resectoscope through the urethra into the bladder.  His bladder was inspected he had a group of papillary tumors on the left bladder wall and also a smaller area on the right anterior lateral wall.  Using the bipolar resectoscope, I resected all the lesions in the bladder down to detrusor muscle.  There were several erythematous areas and I fulgurated.  He had clear efflux from both ureters.  Following this, I removed all of the tumor through the resectoscope sheath and sent this to the surgical pathology lab.  I removed the scope and placed a 22 [place] three-way catheter for continuous irrigation.  He tolerated the procedure well estimated blood loss was 10 cc there were no apparent complications he was extubated and brought to recovery in good condition

## 2023-03-01 NOTE — NURSING
Nurses Note -- 4 Eyes      2/28/2023   8:40 PM      Skin assessed during: Admit      [x] No Pressure Injuries Present    []Prevention Measures Documented      [] Yes- Altered Skin Integrity Present or Discovered   [] LDA Added if Not in Epic (Describe Wound)   [] New Altered Skin Integrity was Present on Admit and Documented in LDA   [] Wound Image Taken    Wound Care Consulted? No    Attending Nurse:  Aditi Craft RN     Second RN/Staff Member:  Delaney Campa RN

## 2023-03-02 ENCOUNTER — ANESTHESIA (OUTPATIENT)
Dept: SURGERY | Facility: HOSPITAL | Age: 88
DRG: 669 | End: 2023-03-02
Payer: MEDICARE

## 2023-03-02 ENCOUNTER — ANESTHESIA EVENT (OUTPATIENT)
Dept: SURGERY | Facility: HOSPITAL | Age: 88
DRG: 669 | End: 2023-03-02
Payer: MEDICARE

## 2023-03-02 LAB
ABO + RH BLD: NORMAL
BLD PROD TYP BPU: NORMAL
BLOOD UNIT EXPIRATION DATE: NORMAL
BLOOD UNIT TYPE CODE: 7300
CROSSMATCH INTERPRETATION: NORMAL
DISPENSE STATUS: NORMAL
ERYTHROCYTE [DISTWIDTH] IN BLOOD BY AUTOMATED COUNT: 17.7 % (ref 11.5–17)
HCT VFR BLD AUTO: 22.9 % (ref 42–52)
HGB BLD-MCNC: 7.5 G/DL (ref 14–18)
MCH RBC QN AUTO: 29.8 PG
MCHC RBC AUTO-ENTMCNC: 32.8 G/DL (ref 33–36)
MCV RBC AUTO: 90.9 FL (ref 80–94)
NRBC BLD AUTO-RTO: 0 %
PLATELET # BLD AUTO: 76 X10(3)/MCL (ref 130–400)
PMV BLD AUTO: 11.6 FL (ref 7.4–10.4)
PSYCHE PATHOLOGY RESULT: NORMAL
RBC # BLD AUTO: 2.52 X10(6)/MCL (ref 4.7–6.1)
UNIT NUMBER: NORMAL
WBC # SPEC AUTO: 83.2 X10(3)/MCL (ref 4.5–11.5)

## 2023-03-02 PROCEDURE — 11000001 HC ACUTE MED/SURG PRIVATE ROOM

## 2023-03-02 PROCEDURE — 94761 N-INVAS EAR/PLS OXIMETRY MLT: CPT

## 2023-03-02 PROCEDURE — 25000003 PHARM REV CODE 250: Performed by: UROLOGY

## 2023-03-02 PROCEDURE — 25000003 PHARM REV CODE 250: Performed by: NURSE ANESTHETIST, CERTIFIED REGISTERED

## 2023-03-02 PROCEDURE — P9016 RBC LEUKOCYTES REDUCED: HCPCS | Performed by: STUDENT IN AN ORGANIZED HEALTH CARE EDUCATION/TRAINING PROGRAM

## 2023-03-02 PROCEDURE — P9016 RBC LEUKOCYTES REDUCED: HCPCS | Performed by: UROLOGY

## 2023-03-02 PROCEDURE — 71000033 HC RECOVERY, INTIAL HOUR: Performed by: UROLOGY

## 2023-03-02 PROCEDURE — 37000008 HC ANESTHESIA 1ST 15 MINUTES: Performed by: UROLOGY

## 2023-03-02 PROCEDURE — 63600175 PHARM REV CODE 636 W HCPCS: Performed by: NURSE ANESTHETIST, CERTIFIED REGISTERED

## 2023-03-02 PROCEDURE — 37000009 HC ANESTHESIA EA ADD 15 MINS: Performed by: UROLOGY

## 2023-03-02 PROCEDURE — 36000707: Performed by: UROLOGY

## 2023-03-02 PROCEDURE — 85027 COMPLETE CBC AUTOMATED: CPT | Performed by: UROLOGY

## 2023-03-02 PROCEDURE — 36000706: Performed by: UROLOGY

## 2023-03-02 PROCEDURE — 27201423 OPTIME MED/SURG SUP & DEVICES STERILE SUPPLY: Performed by: UROLOGY

## 2023-03-02 RX ORDER — ONDANSETRON 2 MG/ML
INJECTION INTRAMUSCULAR; INTRAVENOUS
Status: DISCONTINUED | OUTPATIENT
Start: 2023-03-02 | End: 2023-03-02

## 2023-03-02 RX ORDER — HYDROMORPHONE HYDROCHLORIDE 2 MG/ML
0.4 INJECTION, SOLUTION INTRAMUSCULAR; INTRAVENOUS; SUBCUTANEOUS EVERY 5 MIN PRN
Status: DISCONTINUED | OUTPATIENT
Start: 2023-03-02 | End: 2023-03-03 | Stop reason: HOSPADM

## 2023-03-02 RX ORDER — CEFAZOLIN SODIUM 1 G/3ML
INJECTION, POWDER, FOR SOLUTION INTRAMUSCULAR; INTRAVENOUS
Status: DISCONTINUED | OUTPATIENT
Start: 2023-03-02 | End: 2023-03-02

## 2023-03-02 RX ORDER — DEXAMETHASONE SODIUM PHOSPHATE 4 MG/ML
INJECTION, SOLUTION INTRA-ARTICULAR; INTRALESIONAL; INTRAMUSCULAR; INTRAVENOUS; SOFT TISSUE
Status: DISCONTINUED | OUTPATIENT
Start: 2023-03-02 | End: 2023-03-02

## 2023-03-02 RX ORDER — PROPOFOL 10 MG/ML
VIAL (ML) INTRAVENOUS
Status: DISCONTINUED | OUTPATIENT
Start: 2023-03-02 | End: 2023-03-02

## 2023-03-02 RX ORDER — ROCURONIUM BROMIDE 10 MG/ML
INJECTION, SOLUTION INTRAVENOUS
Status: DISCONTINUED | OUTPATIENT
Start: 2023-03-02 | End: 2023-03-02

## 2023-03-02 RX ORDER — ESMOLOL HYDROCHLORIDE 10 MG/ML
INJECTION INTRAVENOUS
Status: DISCONTINUED | OUTPATIENT
Start: 2023-03-02 | End: 2023-03-02

## 2023-03-02 RX ORDER — LIDOCAINE HYDROCHLORIDE 20 MG/ML
INJECTION, SOLUTION EPIDURAL; INFILTRATION; INTRACAUDAL; PERINEURAL
Status: DISCONTINUED | OUTPATIENT
Start: 2023-03-02 | End: 2023-03-02

## 2023-03-02 RX ORDER — POLYETHYLENE GLYCOL 3350 17 G/17G
17 POWDER, FOR SOLUTION ORAL DAILY PRN
Status: DISCONTINUED | OUTPATIENT
Start: 2023-03-02 | End: 2023-03-03 | Stop reason: HOSPADM

## 2023-03-02 RX ORDER — SODIUM CHLORIDE 0.9 % (FLUSH) 0.9 %
10 SYRINGE (ML) INJECTION
Status: DISCONTINUED | OUTPATIENT
Start: 2023-03-02 | End: 2023-03-03 | Stop reason: HOSPADM

## 2023-03-02 RX ORDER — PHENYLEPHRINE HYDROCHLORIDE 10 MG/ML
INJECTION INTRAVENOUS
Status: DISCONTINUED | OUTPATIENT
Start: 2023-03-02 | End: 2023-03-02

## 2023-03-02 RX ADMIN — PHENYLEPHRINE HYDROCHLORIDE 100 MCG: 10 INJECTION INTRAVENOUS at 12:03

## 2023-03-02 RX ADMIN — PHENYLEPHRINE HYDROCHLORIDE 200 MCG: 10 INJECTION INTRAVENOUS at 12:03

## 2023-03-02 RX ADMIN — ESMOLOL HYDROCHLORIDE 5 MG: 100 INJECTION, SOLUTION INTRAVENOUS at 01:03

## 2023-03-02 RX ADMIN — ROCURONIUM BROMIDE 30 MG: 10 INJECTION, SOLUTION INTRAVENOUS at 12:03

## 2023-03-02 RX ADMIN — CEFAZOLIN 1 G: 1 INJECTION, POWDER, FOR SOLUTION INTRAMUSCULAR; INTRAVENOUS at 12:03

## 2023-03-02 RX ADMIN — LIDOCAINE HYDROCHLORIDE 50 MG: 20 INJECTION, SOLUTION EPIDURAL; INFILTRATION; INTRACAUDAL; PERINEURAL at 12:03

## 2023-03-02 RX ADMIN — ONDANSETRON 4 MG: 2 INJECTION INTRAMUSCULAR; INTRAVENOUS at 12:03

## 2023-03-02 RX ADMIN — ALLOPURINOL 100 MG: 100 TABLET ORAL at 07:03

## 2023-03-02 RX ADMIN — ROCURONIUM BROMIDE 20 MG: 10 INJECTION, SOLUTION INTRAVENOUS at 12:03

## 2023-03-02 RX ADMIN — DOCUSATE SODIUM 100 MG: 100 CAPSULE, LIQUID FILLED ORAL at 07:03

## 2023-03-02 RX ADMIN — SODIUM CHLORIDE, SODIUM GLUCONATE, SODIUM ACETATE, POTASSIUM CHLORIDE AND MAGNESIUM CHLORIDE: 526; 502; 368; 37; 30 INJECTION, SOLUTION INTRAVENOUS at 12:03

## 2023-03-02 RX ADMIN — SUGAMMADEX 120 MG: 100 INJECTION, SOLUTION INTRAVENOUS at 01:03

## 2023-03-02 RX ADMIN — PROPOFOL 100 MG: 10 INJECTION, EMULSION INTRAVENOUS at 12:03

## 2023-03-02 RX ADMIN — DEXAMETHASONE SODIUM PHOSPHATE 4 MG: 4 INJECTION, SOLUTION INTRA-ARTICULAR; INTRALESIONAL; INTRAMUSCULAR; INTRAVENOUS; SOFT TISSUE at 12:03

## 2023-03-02 NOTE — ANESTHESIA PROCEDURE NOTES
Intubation    Date/Time: 3/2/2023 12:23 PM  Performed by: Robb Norwood CRNA  Authorized by: Edgar Salvador MD     Intubation:     Induction:  Intravenous    Intubated:  Postinduction    Mask Ventilation:  Easy mask    Attempts:  1    Attempted By:  CRNA    Method of Intubation:  Direct    Blade:  [place] 2    Laryngeal View Grade: Grade I - full view of cords      Difficult Airway Encountered?: No      Complications:  None    Airway Device:  Oral endotracheal tube    Airway Device Size:  7.5    Style/Cuff Inflation:  Cuffed (inflated to minimal occlusive pressure)    Inflation Amount (mL):  6    Tube secured:  21    Secured at:  The lips    Placement Verified By:  Capnometry    Complicating Factors:  None    Findings Post-Intubation:  BS equal bilateral and atraumatic/condition of teeth unchanged

## 2023-03-02 NOTE — PLAN OF CARE
03/02/23 0900   Discharge Reassessment   Assessment Type Discharge Planning Reassessment   Discharge Plan discussed with: Patient;Spouse/sig other;Adult children   Discharge Plan A Home Health   Discharge Plan B Home Health   Post-Acute Status   Post-Acute Authorization Home Health   Home Health Status Referrals Sent  ([facility])

## 2023-03-02 NOTE — ANESTHESIA PREPROCEDURE EVALUATION
03/02/2023  Francis J Colletta is a 89 y.o., male.    Other Medical History   Frequent urination Hyperlipidemia   CAD (coronary artery disease) Osteoarthritis   BPH (benign prostatic hyperplasia) Disorder of white blood cells, unspecified   Myeloproliferative disorder Scrotal hematoma   Stroke      Surgical History  PARTIAL HIP ARTHROPLASTY TONSILLECTOMY   CATARACT EXTRACTION, BILATERAL PENILE PROSTHESIS IMPLANT   REMOVAL OF PENILE IMPLANT SCROTUM EXPLORATION   TURBT (TRANSURETHRAL RESECTION OF BLADDER TUMOR)      Pt with persistent hematuria following TURBT, requiring blood transfusion and cysto today.  Patient received 2u pRBCs yesterday with minimal improvement in H/H.  This AM, patient has h/h 7.5 / 23 / 76k.  TTE 2018 nl lvef 60%, grade 1 dd, nl RV function.  Will transfuse pre-op.  Vital signs relatively stable, though slightly hypotensive.    [place] HERRING     Pre-op Assessment    I have reviewed the Patient Summary Reports.     I have reviewed the Nursing Notes. I have reviewed the NPO Status.   I have reviewed the Medications.     Review of Systems  Anesthesia Hx:  No problems with previous Anesthesia    Hematology/Oncology:  Hematology Normal   Oncology Normal     EENT/Dental:EENT/Dental Normal   Cardiovascular:  Cardiovascular Normal Exercise tolerance: good CAD     Functional Capacity good / => 4 METS    Pulmonary:  Pulmonary Normal    Renal/:   Denies Chronic Renal Disease.     Hepatic/GI:  Hepatic/GI Normal    Musculoskeletal:   Arthritis     Neurological:   CVA    Endocrine:  Endocrine Normal  Denies Morbid Obesity / BMI > 40  Dermatological:  Skin Normal    Psych:  Psychiatric Normal           Physical Exam  General: Alert, Oriented, Well nourished and Cooperative    Airway:  Mallampati: II   Mouth Opening: Normal  TM Distance: Normal  Tongue: Normal  Neck ROM: Normal ROM    Dental:Multiple  missing, upper edentulous      Anesthesia Plan  Type of Anesthesia, risks & benefits discussed:    Anesthesia Type: Gen Supraglottic Airway  Intra-op Monitoring Plan: Standard ASA Monitors  Post Op Pain Control Plan: multimodal analgesia  Induction:  IV  Informed Consent: Informed consent signed with the Patient and all parties understand the risks and agree with anesthesia plan.  All questions answered. Patient consented to blood products? Yes  ASA Score: 3  Day of Surgery Review of History & Physical: H&P Update referred to the surgeon/provider.    Ready For Surgery From Anesthesia Perspective.     .

## 2023-03-02 NOTE — PROGRESS NOTES
Subjective:  No problems reported overnight.  He got 2 units of blood secondary to persistent hematuria after TURBT    Objective:  Vitals:    03/02/23 0318   BP: 90/61   Pulse: 84   Resp: 16   Temp: 99.3 °F (37.4 °C)     General: A&Ox3, in no distress    Neck: No masses, normal thyroid    Lungs: CTA williams, no use of accessory muscles    Heart: RRR, no arrhythmias    Abdomen: Soft, nondistended    :  Three-way Parker in place with bloody urine with significant continuous irrigation    Skin: The skin is warm and dry. No jaundice.    Ext: No c/c/e.      Labs:      Recent Labs   Lab 03/01/23  0643 03/01/23  1149 03/01/23  1818   .3*  --   --    HGB 8.7*   < > 7.2*   HCT 26.8*   < > 21.9*   PLT 75*  --   --     < > = values in this interval not displayed.      No results for input(s): NA, K, CL, CO2, BUN, CREATININE, CALCIUM, PROT, BILITOT, ALKPHOS, ALT, AST, GLUCOSE in the last 168 hours.    Invalid input(s): LABALBU   No results for input(s): NA, K, CL, CO2, BUN, CREATININE, LABGLOM, GLUCOSE, CALCIUM in the last 168 hours.         Impression:  Bladder tumor-status post TURBT.  Bladder irrigation remains bloody.  He got 2 units of blood last night.    Plan:  Will bring to the OR today for cystoscopy and fulguration.  NPO

## 2023-03-02 NOTE — TRANSFER OF CARE
"Anesthesia Transfer of Care Note    Patient: Francis J Colletta    Procedure(s) Performed: Procedure(s) (LRB):  TURBT (TRANSURETHRAL RESECTION OF BLADDER TUMOR) (N/A)    Patient location: PACU    Anesthesia Type: general    Transport from OR: Transported from OR on room air with adequate spontaneous ventilation    Post pain: adequate analgesia    Post assessment: no apparent anesthetic complications    Post vital signs: stable    Level of consciousness: sedated and responds to stimulation    Nausea/Vomiting: no nausea/vomiting    Complications: none    Transfer of care protocol was followed      Last vitals:   Visit Vitals  BP (!) 97/59   Pulse 69   Temp 36.2 °C (97.2 °F)   Resp 15   Ht 5' 9" (1.753 m)   Wt 58.4 kg (128 lb 12 oz)   SpO2 98%   BMI 19.01 kg/m²     "

## 2023-03-02 NOTE — PLAN OF CARE
03/02/23 0838   Discharge Assessment   Assessment Type Discharge Planning Assessment   Confirmed/corrected address, phone number and insurance Yes   Confirmed Demographics Correct on Facesheet   Source of Information patient;family   People in Home spouse   Do you expect to return to your current living situation? Yes   Do you have help at home or someone to help you manage your care at home? Yes   Prior to hospitilization cognitive status: Alert/Oriented   Current cognitive status: Alert/Oriented   Walking or Climbing Stairs ambulation difficulty, requires equipment   Mobility Management Cane   Home Accessibility wheelchair accessible   Home Layout Able to live on 1st floor   Equipment Currently Used at Home cane, straight   Readmission within 30 days? No   Patient currently being followed by outpatient case management? No   Do you currently have service(s) that help you manage your care at home? No   Do you take prescription medications? Yes   Do you have prescription coverage? Yes   Do you have any problems affording any of your prescribed medications? No   Is the patient taking medications as prescribed? yes   How do you get to doctors appointments? car, drives self   Are you on dialysis? No   Do you take coumadin? No   Discharge Plan A Home Health   Discharge Plan B Home with family   DME Needed Upon Discharge  none   Discharge Plan discussed with: Patient   Discharge Barriers Identified None     Patient lives with spouse in a single story home. Patient's son lives in [place]. Patient reports independent prior to DC. DME in Home: Cane. Patient and wife requesting home health. PCP: Dr. Miller, Pharmacy: [facility]

## 2023-03-03 VITALS
BODY MASS INDEX: 19.07 KG/M2 | HEIGHT: 69 IN | OXYGEN SATURATION: 92 % | SYSTOLIC BLOOD PRESSURE: 92 MMHG | HEART RATE: 78 BPM | DIASTOLIC BLOOD PRESSURE: 63 MMHG | TEMPERATURE: 98 F | RESPIRATION RATE: 16 BRPM | WEIGHT: 128.75 LBS

## 2023-03-03 PROBLEM — C67.9 BLADDER CANCER: Status: ACTIVE | Noted: 2023-03-03

## 2023-03-03 PROBLEM — C67.9 BLADDER CANCER: Status: RESOLVED | Noted: 2023-03-03 | Resolved: 2023-03-03

## 2023-03-03 PROCEDURE — 25000003 PHARM REV CODE 250: Performed by: UROLOGY

## 2023-03-03 RX ORDER — NITROFURANTOIN 25; 75 MG/1; MG/1
100 CAPSULE ORAL 2 TIMES DAILY
Qty: 14 CAPSULE | Refills: 0 | Status: SHIPPED | OUTPATIENT
Start: 2023-03-03

## 2023-03-03 RX ADMIN — DOCUSATE SODIUM 100 MG: 100 CAPSULE, LIQUID FILLED ORAL at 08:03

## 2023-03-03 RX ADMIN — ATORVASTATIN CALCIUM 20 MG: 10 TABLET, FILM COATED ORAL at 08:03

## 2023-03-03 RX ADMIN — TAMSULOSIN HYDROCHLORIDE 0.4 MG: 0.4 CAPSULE ORAL at 08:03

## 2023-03-03 NOTE — OP NOTE
OPERATIVE REPORT    PREOPERATIVE DIAGNOSIS(ES):  1. Hematuria  2. History of bladder cancer.    POSTOPERATIVE DIAGNOSIS(ES):  1. Hematuria  2. History of bladder cancer.    PROCEDURE:  1. Cysto with clot evacuation  2. Transurethral treatment of bladder tumor base with fulguration; >5cm in size    SURGEON:  Micha Bravo MD    ANESTHESIA:  General.    FLUIDS:  1500 mL.    ESTIMATED BLOOD LOSS:  25 mL.    SPECIMENS:  none    FINDINGS:  Large amount of clot within the bladder  Left lateral wall and dome bladder tumor base which was actively bleeding    DRAINS:  An 24 Ukrainian 3 way Parker catheter with CBI to gravity drainage.    COMPLICATIONS:  None.    SUMMARY:  After informed consent was obtained, the patient brought to the operating room and placed in  supine position. After adequate general anesthetic, they were positioned in dorsal lithotomy.  The genitalia was prepped and draped sterile manner. A time-out was performed and the  patient identified using 2 identifiers and procedure site was verified. A 26-Ukrainian resectoscope was advanced into the bladder.     There was a large amount of bladder clot which was then removed with a Toomi syringe.     After this, the bladder was drained and examined  The urethra was unremarkable. The above bladder tumor bases were seen at the left lateral wall and dome. Hemostasis was achieved with a pinpoint electrocautery. Total area treated with >5cm in size. The scope was removed. The patient tolerated the procedure well. The patient was extubated and  transported to the recovery room stable condition.    PLAN:  Transfer back to floor with CBI  03/03/2023  3:09 PM

## 2023-03-03 NOTE — DISCHARGE SUMMARY
[facility] - 9th Floor Med Surg  Discharge Note  Short Stay    Procedure(s) (LRB):  TURBT (TRANSURETHRAL RESECTION OF BLADDER TUMOR) (N/A)      OUTCOME: Condition has improved and patient is now ready for discharge.    DISPOSITION: Home or Self Care    FINAL DIAGNOSIS:  Bladder cancer    FOLLOWUP: In clinic    DISCHARGE INSTRUCTIONS:    Discharge Procedure Orders   SUBSEQUENT HOME HEALTH ORDERS   Order Comments: Arrange Home Health services; SN/PT eval and treat  PCP:   Dr. Miller     Order Specific Question Answer Comments   What Home Health Agency is the patient currently using? Other/External         TIME SPENT ON DISCHARGE: 10 minutes

## 2023-03-03 NOTE — PROGRESS NOTES
UROLOGY  PROGRESS  NOTE    Francis J Colletta 11/4/1933  [id number]  3/3/2023    Subjective:  feeling better ...ready to go home.  Postop day 1. Status post cystoscopy with bladder fulguration.  Postop day 3. Status post TURBT      Objective:  Wt Readings from Last 3 Encounters:   02/28/23 58.4 kg (128 lb 12 oz)   02/22/23 60.2 kg (132 lb 12.8 oz)   01/25/23 60.1 kg (132 lb 7.9 oz)     Temp Readings from Last 3 Encounters:   03/03/23 98.1 °F (36.7 °C) (Oral)   02/22/23 98.5 °F (36.9 °C)   01/25/23 98.5 °F (36.9 °C) (Oral)     BP Readings from Last 3 Encounters:   03/03/23 106/65   02/22/23 103/68   01/25/23 103/71     Pulse Readings from Last 3 Encounters:   03/03/23 77   02/22/23 71   01/25/23 89       Intake/Output:  No intake/output data recorded.  I/O last 3 completed shifts:  In: 1812.5 [Blood:1212.5; IV Piggyback:600]  Out: 7550 [Urine:7550]       Exam:    NCAT  Card RRR  Resp unlabored  Abd soft, nontender nondistended   24 Monegasque Parker in place with blood-tinged urine, off CBI  Extremity no C/C/E      Recent Results (from the past 24 hour(s))   CBC Without Differential    Collection Time: 03/02/23  8:04 AM   Result Value Ref Range    WBC 83.2 (HH) 4.5 - 11.5 x10(3)/mcL    RBC 2.52 (L) 4.70 - 6.10 x10(6)/mcL    Hgb 7.5 (L) 14.0 - 18.0 g/dL    Hct 22.9 (L) 42.0 - 52.0 %    Platelet 76 (L) 130 - 400 x10(3)/mcL    MCV 90.9 80.0 - 94.0 fL    MCH 29.8 pg    MCHC 32.8 (L) 33.0 - 36.0 g/dL    RDW 17.7 (H) 11.5 - 17.0 %    MPV 11.6 (H) 7.4 - 10.4 fL    NRBC% 0.0 %             Assessment:  Bladder cancer-status post Transurethral resection of noninvasive appearing tumors.  He required cystoscopy and fulguration yesterday secondary to persistent bleeding    Chronic leukemia-he is established with an oncologist      Plan:  DC home today with Parker catheter to gravity.  Will have home health take out his catheter next week Monday or Tuesday    Follow-up with Oncology as scheduled    Moisés Carbone MD

## 2023-03-03 NOTE — DISCHARGE SUMMARY
Discharge Summary    Francis J Colletta   11/4/1933   [id number]     Date of Admission:  02/28/2023    Date of Discharge:  3/03/2023      CC:  Bladder tumor    Hisotry of Present Illness:      89-year-old male who has a history of gross hematuria and on workup he was found to have multiple noninvasive appearing papillary bladder tumors.  He is being admitted for elective resection        Hospital Course:      Patient was admitted to the hospital on 02/28/2023 was taken to the operating room where he underwent Transurethral resection of the bladder tumors which was well tolerated postoperatively he had significant amount blood in his bladder irrigation.  He required blood transfusion on the evening of postop day 1. And on postop day 2. He was taken to the operating room for cystoscopy with bladder fulguration.  Today his urine has only slightly blood-tinged urine in his irrigation is off.  He feels well and his vital signs are stable.  He is being discharged home in good condition we will have home health check on him next week and also remove his catheter at that time.  He will follow-up with me in 3 months since it is difficult for him to get to [place] for cystoscopy

## 2023-03-03 NOTE — PHYSICIAN QUERY
PT Name: Francis J Colletta  MR #: [id number]    DOCUMENTATION CLARIFICATION    Marina Mccurdy RN, CCDS    [email or web address]    Documentation Excellence  This form is a permanent document in the medical record.      Query Date: March 3, 2023    By submitting this query, we are merely seeking further clarification of documentation.   Please utilize your independent clinical judgment when addressing the question(s) below.    The Medical Record contains the following:   Indicators  Supporting Clinical Findings Location in Medical Record    Anemia documented     x H&H  03/01/23 06:43 03/01/23 11:49 03/01/23 18:18 03/02/23 08:04   Hemoglobin 8.7 (L) 8.5 (L) 7.2 (L) 7.5 (L)   Hematocrit 26.8 (L) 26.0 (L) 21.9 (L) 22.9 (L    lab   x BP                    HR Vital signs relatively stable, though slightly hypotensive 3/2 Anesthesia    GI bleeding documented     x Acute bleeding (Non GI site) persistent hematuria following TURBT, requiring blood transfusion and cysto today.  Patient received 2u pRBCs yesterday with minimal improvement in H/H.     Anesthesia 3/2   x Transfusion(s) Dropping H/H, CBI with heavy bleeding     4 units pRBC transfused total   Blood Bank Orders    Blood Bank   x Acute/Chronic illness Bladder cancer- s/pt Transurethral resection of noninvasive appearing tumors.  He required cystoscopy and fulguration yesterday secondary to persistent bleeding  Chronic leukemia   3/3 Urology PN   x Treatments urine got bloody this am.  CBI   will check a hemoglobin and hematocrit of 12:00 p.m. he may need a blood transfusion    He got 2 units of blood secondary to persistent hematuria after TURBT  Plan: Will bring to the OR today for cystoscopy and fulguration.    3/1 POD 1  Urology PN        3/2 Urology PN   x Other OP Note  2/28/2023   Tona HERRING  Urology  Dx: Bladder tumor  Proc:  TURBT  2-5 cm  Ebl: 10 cc    Op Note     Provider, please specify diagnosis or diagnoses associated with above clinical findings.     [    ] Acute blood loss anemia - complication of surgery    [   ] Acute blood loss anemia expected post-operatively    [   ] Other Other diagnosis - specify: _________________   [   ]   Clinically Undetermined         Please document in your progress notes daily for the duration of treatment, until resolved, and include in your discharge summary.    Form No. 01869       NOT My Patient.

## 2023-03-03 NOTE — ANESTHESIA POSTPROCEDURE EVALUATION
Anesthesia Post Evaluation    Patient: Francis J Colletta    Procedure(s) Performed: Procedure(s) (LRB):  TURBT (TRANSURETHRAL RESECTION OF BLADDER TUMOR) (N/A)    Final Anesthesia Type: general      Patient location during evaluation: PACU  Patient participation: Yes- Able to Participate  Level of consciousness: awake and alert  Post-procedure vital signs: reviewed and stable  Pain management: adequate  Airway patency: patent    PONV status at discharge: No PONV  Anesthetic complications: no      Respiratory status: unassisted  Hydration status: euvolemic  Follow-up not needed.          Vitals Value Taken Time   /63 03/02/23 1421   Temp 36.6 °C (97.9 °F) 03/02/23 1441   Pulse 69 03/02/23 1441   Resp 15 03/02/23 1427   SpO2 95 % 03/02/23 1441   Vitals shown include unvalidated device data.      Event Time   Out of Recovery 03/02/2023 14:30:00         Pain/Amisha Score: Amisha Score: 9 (3/2/2023  2:30 PM)

## 2023-03-07 ENCOUNTER — PATIENT OUTREACH (OUTPATIENT)
Dept: ADMINISTRATIVE | Facility: CLINIC | Age: 88
End: 2023-03-07
Payer: MEDICARE

## 2023-03-16 NOTE — PROGRESS NOTES
Subjective:       Patient ID: Francis J Colletta is a 89 y.o. male.    Chief Complaint:  Poor appetite and weight loss    Diagnosis:  Myeloproliferative disorder                     Recurrent superficial bladder carcinoma    Current Treatment: Hydroxyurea started 5/06/22 - current dose  500 mg/d    Clinical History:  Patient referred by his PCP for progressive leukocytosis.  Review of previous laboratory showed a WBC count averaging between 15K to 20K dating back to at least 8/19.  Differential showed a mild left shift and no absolute lymphocytosis.  CBC 03/15/22 showed a WBC count of 23.4 with a differential of 70% neutrophils, 14% lymphocytes, 10% monocytes, 2% metas and 2% myelocytes.  Hemoglobin was 14.1, hematocrit 45.7 and platelets 153. Peripheral blood flow cytometry showed no abnormal monoclonal cell populations. Bone marrow aspirate and biopsy 4/19/22 showed a hypercellular marrow with granulocytic hyperplasia and 5.8% myeloblasts.  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  Cytogenetics were normal 46, XY.  FISH for BCR-ABL was negative.  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     CT chest 3/21/22 but for follow-up of pulmonary nodules showed stable, small subcentimeter pulmonary nodules and COPD changes.  There is no suspicious lymphadenopathy.    He was seen as a new patient 5/5/22 for a hematology evaluation.  Laboratory testing showed progressive leukocytosis with a white count of 41.4.  There was mild anemia with microcytic, hyperchromic red cell indices, platelet count was normal.  He had no peripheral adenopathy, splenomegaly or B symptoms.  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  He was started on cytoreductive therapy with hydroxyurea 500 mg daily.     His dose has had to be adjusted secondary to thrombocytopenia.  He underwent removal of a penile implant 11/22.  Postoperative  course was complicated by development of a scrotal hematoma requiring evacuation 12/6/22.  Following that procedure, he continued to have intermittent bleeding from the surgical site.  Symptoms persisted despite additional surgical intervention.  He remained thrombocytopenic on Hydrea requiring dose reduction.    Interval History  He returns to the office today for a 4 week follow-up visit accompanied by his son.  He underwent a TURBT 2/28/23 with resection of a superficial papillary urothelial carcinoma.  There was no evidence of muscle invasion.  He did have significant hematuria following the procedure and required PRBC transfusion.  Hydrea therapy was held prior to the surgery and resumed postoperatively.  He is currently taking 500 mg daily.  His appetite has been very poor.  His son reports he is eating very little.  He has lost weight.  He denies any fever, chills or night sweats.  CBC today shows a WBC count of 97 with a left-shifted differential.  Monocyte percentage is not significantly increased.  Hemoglobin is 9.8 and platelets 113.    Review of Systems   Constitutional:  Positive for appetite change, fatigue and unexpected weight change. Negative for fever.   HENT:  Positive for hearing loss. Negative for mouth sores, nosebleeds, sore throat and trouble swallowing.    Eyes: Negative.    Respiratory:  Negative for cough and shortness of breath.    Cardiovascular:  Negative for chest pain, palpitations and leg swelling.   Gastrointestinal:  Negative for abdominal distention, abdominal pain, constipation, diarrhea and nausea.   Endocrine: Negative.    Genitourinary:  Negative for dysuria, frequency, hematuria and urgency.   Musculoskeletal:  Positive for arthralgias and back pain.   Integumentary:  Negative for rash.   Neurological:  Negative for dizziness, weakness, numbness and headaches.   Hematological:  Negative for adenopathy. Bruises/bleeds easily.   Psychiatric/Behavioral: Negative.         PMHx:   CAD, Hyperlipidemia, CVA, osteoarthritis, pneumonia, BPH  PSHx:  Tonsils, right hip replacement, AAA repair, TURBT  SH:  Former heavy pipe smoker, quit 2019. Lives in [place] with his wife, retired .  FH:  His daughter had lung cancer.    Objective:     Physical Exam  Vitals reviewed.   Constitutional:       Comments: Thin, elderly white male in no acute distress   HENT:      Head: Normocephalic and atraumatic.      Mouth/Throat:      Mouth: Mucous membranes are moist.      Pharynx: Oropharynx is clear. No posterior oropharyngeal erythema.      Comments: No oral lesions  Eyes:      Extraocular Movements: Extraocular movements intact.      Conjunctiva/sclera: Conjunctivae normal.      Pupils: Pupils are equal, round, and reactive to light.   Cardiovascular:      Rate and Rhythm: Normal rate and regular rhythm.      Heart sounds: No murmur heard.  Pulmonary:      Comments: Lungs clear to auscultation  Abdominal:      General: Bowel sounds are normal. There is no distension.      Palpations: Abdomen is soft. There is no splenomegaly.      Tenderness: There is no abdominal tenderness.      Comments: No palpable splenomegaly   Musculoskeletal:         General: No swelling or tenderness. Normal range of motion.      Cervical back: Normal range of motion and neck supple. No tenderness.   Lymphadenopathy:      Comments: No palpable cervical, supraclavicular, axillary or inguinal adenopathy   Skin:     General: Skin is warm and dry.      Findings: Bruising (Scattered ecchymoses on arms) present. No rash.   Neurological:      General: No focal deficit present.      Mental Status: He is alert and oriented to person, place, and time.      Cranial Nerves: No cranial nerve deficit.         LABORATORY  Recent Results (from the past 336 hour(s))   CBC with Differential    Collection Time: 03/22/23  9:00 AM   Result Value Ref Range    WBC 97.1 (HH) 4.5 - 11.5 x10(3)/mcL    RBC 3.26 (L) 4.70 - 6.10 x10(6)/mcL     Hgb 9.8 (L) 14.0 - 18.0 g/dL    Hct 32.2 (L) 42.0 - 52.0 %    MCV 98.8 (H) 80.0 - 94.0 fL    MCH 30.1 pg    MCHC 30.4 (L) 33.0 - 36.0 g/dL    RDW 17.9 (H) 11.5 - 17.0 %    Platelet 113 (L) 130 - 400 x10(3)/mcL    MPV 11.0 (H) 7.4 - 10.4 fL    Neut % 84.7 %    Lymph % 2.3 %    Mono % 6.9 %    Eos % 0.1 %    Basophil % 0.1 %    Lymph # 2.19 0.6 - 4.6 x10(3)/mcL    Neut # 82.29 (H) 2.1 - 9.2 x10(3)/mcL    Mono # 6.66 (H) 0.1 - 1.3 x10(3)/mcL    Eos # 0.07 0 - 0.9 x10(3)/mcL    Baso # 0.11 0 - 0.2 x10(3)/mcL    IG# 5.73 (H) 0 - 0.04 x10(3)/mcL    IG% 5.9 %           Assessment:   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  Repeat CBC in 2 weeks by home health.  RTC in 4 weeks for a follow-up visit and repeat laboratory.  Trial of Megace 20 cc daily for appetite.  Given his advanced age, poor performance status and comorbidities, he is not an appropriate candidate for a trial of therapy with a hypomethylating agent.      XI ALFARO MD    Other Phyisicans  Dr. Sinan Carobne

## 2023-03-22 ENCOUNTER — OFFICE VISIT (OUTPATIENT)
Dept: HEMATOLOGY/ONCOLOGY | Facility: CLINIC | Age: 88
End: 2023-03-22
Payer: MEDICARE

## 2023-03-22 ENCOUNTER — TELEPHONE (OUTPATIENT)
Dept: HEMATOLOGY/ONCOLOGY | Facility: CLINIC | Age: 88
End: 2023-03-22

## 2023-03-22 VITALS
HEART RATE: 105 BPM | OXYGEN SATURATION: 96 % | DIASTOLIC BLOOD PRESSURE: 57 MMHG | TEMPERATURE: 98 F | HEIGHT: 69 IN | WEIGHT: 128.88 LBS | RESPIRATION RATE: 18 BRPM | SYSTOLIC BLOOD PRESSURE: 88 MMHG | BODY MASS INDEX: 19.09 KG/M2

## 2023-03-22 DIAGNOSIS — D47.1 MYELOPROLIFERATIVE DISORDER: Primary | ICD-10-CM

## 2023-03-22 PROCEDURE — 3288F PR FALLS RISK ASSESSMENT DOCUMENTED: ICD-10-PCS | Mod: CPTII,S$GLB,, | Performed by: INTERNAL MEDICINE

## 2023-03-22 PROCEDURE — 1126F AMNT PAIN NOTED NONE PRSNT: CPT | Mod: CPTII,S$GLB,, | Performed by: INTERNAL MEDICINE

## 2023-03-22 PROCEDURE — 99214 OFFICE O/P EST MOD 30 MIN: CPT | Mod: S$GLB,,, | Performed by: INTERNAL MEDICINE

## 2023-03-22 PROCEDURE — 1126F PR PAIN SEVERITY QUANTIFIED, NO PAIN PRESENT: ICD-10-PCS | Mod: CPTII,S$GLB,, | Performed by: INTERNAL MEDICINE

## 2023-03-22 PROCEDURE — 1159F PR MEDICATION LIST DOCUMENTED IN MEDICAL RECORD: ICD-10-PCS | Mod: CPTII,S$GLB,, | Performed by: INTERNAL MEDICINE

## 2023-03-22 PROCEDURE — 1101F PT FALLS ASSESS-DOCD LE1/YR: CPT | Mod: CPTII,S$GLB,, | Performed by: INTERNAL MEDICINE

## 2023-03-22 PROCEDURE — 99999 PR PBB SHADOW E&M-EST. PATIENT-LVL IV: ICD-10-PCS | Mod: PBBFAC,,, | Performed by: INTERNAL MEDICINE

## 2023-03-22 PROCEDURE — 3288F FALL RISK ASSESSMENT DOCD: CPT | Mod: CPTII,S$GLB,, | Performed by: INTERNAL MEDICINE

## 2023-03-22 PROCEDURE — 1160F RVW MEDS BY RX/DR IN RCRD: CPT | Mod: CPTII,S$GLB,, | Performed by: INTERNAL MEDICINE

## 2023-03-22 PROCEDURE — 99214 PR OFFICE/OUTPT VISIT, EST, LEVL IV, 30-39 MIN: ICD-10-PCS | Mod: S$GLB,,, | Performed by: INTERNAL MEDICINE

## 2023-03-22 PROCEDURE — 1111F DSCHRG MED/CURRENT MED MERGE: CPT | Mod: CPTII,S$GLB,, | Performed by: INTERNAL MEDICINE

## 2023-03-22 PROCEDURE — 1111F PR DISCHARGE MEDS RECONCILED W/ CURRENT OUTPATIENT MED LIST: ICD-10-PCS | Mod: CPTII,S$GLB,, | Performed by: INTERNAL MEDICINE

## 2023-03-22 PROCEDURE — 99999 PR PBB SHADOW E&M-EST. PATIENT-LVL IV: CPT | Mod: PBBFAC,,, | Performed by: INTERNAL MEDICINE

## 2023-03-22 PROCEDURE — 1160F PR REVIEW ALL MEDS BY PRESCRIBER/CLIN PHARMACIST DOCUMENTED: ICD-10-PCS | Mod: CPTII,S$GLB,, | Performed by: INTERNAL MEDICINE

## 2023-03-22 PROCEDURE — 1101F PR PT FALLS ASSESS DOC 0-1 FALLS W/OUT INJ PAST YR: ICD-10-PCS | Mod: CPTII,S$GLB,, | Performed by: INTERNAL MEDICINE

## 2023-03-22 PROCEDURE — 1159F MED LIST DOCD IN RCRD: CPT | Mod: CPTII,S$GLB,, | Performed by: INTERNAL MEDICINE

## 2023-03-22 RX ORDER — HYDROXYUREA 500 MG/1
CAPSULE ORAL
Qty: 45 CAPSULE | Refills: 3 | Status: SHIPPED | OUTPATIENT
Start: 2023-03-22

## 2023-03-22 RX ORDER — MEGESTROL ACETATE 40 MG/ML
800 SUSPENSION ORAL DAILY
Qty: 600 ML | Refills: 11 | Status: SHIPPED | OUTPATIENT
Start: 2023-03-22 | End: 2024-03-21

## 2023-04-03 ENCOUNTER — TELEPHONE (OUTPATIENT)
Dept: HEMATOLOGY/ONCOLOGY | Facility: CLINIC | Age: 88
End: 2023-04-03
Payer: MEDICARE

## 2023-04-03 NOTE — TELEPHONE ENCOUNTER
Dr. Miller would like to s/w Dr. Gorman regarding this patient. He recently admitted him with hgb - 7.0, plt - 50 and white count - 100. He transfused him with red cells. Platelet count has come up to 168, but white count remains elevated. Dr. Miller can be reached @ [phone].

## 2023-04-25 ENCOUNTER — TELEPHONE (OUTPATIENT)
Dept: HEMATOLOGY/ONCOLOGY | Facility: CLINIC | Age: 88
End: 2023-04-25
Payer: MEDICARE

## 2023-04-25 NOTE — TELEPHONE ENCOUNTER
Spoke with patient's son, Mahesh. He has been admitted to hospice by Dr. Miller. Appointment tomorrow has been cancelled.

## (undated) DEVICE — RESERVOIR JACKSON-PRATT 100CC

## (undated) DEVICE — Device

## (undated) DEVICE — JELLY SURGILUBE LUBE TUBE 2OZ

## (undated) DEVICE — GLOVE PROTEXIS HYDROGEL SZ7.5

## (undated) DEVICE — TRAY SKIN SCRUB WET PREMIUM

## (undated) DEVICE — SOL IRRI STRL WATER 1000ML

## (undated) DEVICE — CATH BARDEX 3-WAY FOLEY 22FR

## (undated) DEVICE — BAG DRAIN ANTI REFLUX 2000ML

## (undated) DEVICE — GAUZE SPONGE 4X4 12PLY

## (undated) DEVICE — SUT MCRYL PLUS 4-0 PS2 27IN

## (undated) DEVICE — SOL NACL IRR 1000ML BTL

## (undated) DEVICE — SUPPORT ULNA NERVE PROTECTOR

## (undated) DEVICE — SOL NACL IRR 3000ML

## (undated) DEVICE — LOOP CUT RESECTSCPE 30 DG 24F

## (undated) DEVICE — SUT CHROMIC 3-0 SH 27IN GUT

## (undated) DEVICE — GLOVE PROTEXIS BLUE LATEX 7

## (undated) DEVICE — SYS IRRISEPT 450ML0.05% CHG

## (undated) DEVICE — ADAPTER STOPCOCK FEMALE LL

## (undated) DEVICE — ELECTRODE PATIENT RETURN DISP

## (undated) DEVICE — DRAIN SURG HUBLESS 30CM 15FR

## (undated) DEVICE — SYR 50ML CATH TIP

## (undated) DEVICE — GLOVE PROTEXIS LTX MICRO  7.5

## (undated) DEVICE — SPONGE LAP STRL 18X18IN

## (undated) DEVICE — KIT SURGICAL TURNOVER

## (undated) DEVICE — POSITIONER HEAD ADULT

## (undated) DEVICE — ELECTRODE HF RESECT 28FR 12DEG

## (undated) DEVICE — SPONGE KERLIX ANTIMIC 6X6.75IN

## (undated) DEVICE — PAD PINK TRENDELENBURG POS XL

## (undated) DEVICE — SUT VICRYL CTD 2-0 GI 27 SH

## (undated) DEVICE — CONTAINER SPECIMEN SCREW 4OZ

## (undated) DEVICE — BAG DRAIN UROLOGY AND HOSE

## (undated) DEVICE — SUT 2/0 30IN SILK BLK BRAI

## (undated) DEVICE — TRAY CATH FOL SIL URIMTR 16FR

## (undated) DEVICE — SET UROLOGY TBNG UP TO 300MMHG

## (undated) DEVICE — SUT CTD VICRYL BR CR/SH VIL

## (undated) DEVICE — TUBE SUCTION MEDI-VAC STERILE

## (undated) DEVICE — BOWL UTILITY BLUE 32OZ

## (undated) DEVICE — SYR 30CC LUER LOCK

## (undated) DEVICE — DRAPE STERI U-SHAPED 47X51IN

## (undated) DEVICE — ADHESIVE DERMABOND ADVANCED

## (undated) DEVICE — SYR ONLY 60CC TOOMEY

## (undated) DEVICE — ELECTRODE RESECTION HF 30DEG

## (undated) DEVICE — GLOVE PROTEXIS PI SYN SURG 6.5